# Patient Record
Sex: FEMALE | Race: WHITE | NOT HISPANIC OR LATINO | Employment: OTHER | URBAN - METROPOLITAN AREA
[De-identification: names, ages, dates, MRNs, and addresses within clinical notes are randomized per-mention and may not be internally consistent; named-entity substitution may affect disease eponyms.]

---

## 2017-01-10 ENCOUNTER — OFFICE VISIT (OUTPATIENT)
Dept: FAMILY MEDICINE CLINIC | Facility: CLINIC | Age: 72
End: 2017-01-10

## 2017-01-10 VITALS
HEART RATE: 92 BPM | SYSTOLIC BLOOD PRESSURE: 150 MMHG | DIASTOLIC BLOOD PRESSURE: 70 MMHG | TEMPERATURE: 99 F | HEIGHT: 63 IN | BODY MASS INDEX: 31.36 KG/M2 | OXYGEN SATURATION: 96 % | RESPIRATION RATE: 18 BRPM | WEIGHT: 177 LBS

## 2017-01-10 DIAGNOSIS — I10 ESSENTIAL HYPERTENSION: ICD-10-CM

## 2017-01-10 PROCEDURE — 99213 OFFICE O/P EST LOW 20 MIN: CPT | Performed by: FAMILY MEDICINE

## 2017-01-10 RX ORDER — HYDROCHLOROTHIAZIDE 25 MG/1
50 TABLET ORAL DAILY
Qty: 180 TABLET | Refills: 3 | Status: SHIPPED | OUTPATIENT
Start: 2017-01-10 | End: 2017-07-18 | Stop reason: SDUPTHER

## 2017-01-10 NOTE — MR AVS SNAPSHOT
Jory Crowley   1/10/2017 10:00 AM   Office Visit    Dept Phone:  [phone]   Encounter #:  [id number]    Provider:  Derek Arellano MD   Department:  [facility]                Your Full Care Plan              Today's Medication Changes          These changes are accurate as of: 1/10/17 10:22 AM.  If you have any questions, ask your nurse or doctor.               Stop taking medication(s)listed here:     losartan 100 MG tablet   Commonly known as:  COZAAR   Stopped by:  Derek Arellano MD                Where to Get Your Medications      These medications were sent to [facility] [address] [place], [place] [facility] [address] [facility] [address] - [phone]  - [phone]   [address] [facility] [address]     Phone:  [phone]     hydrochlorothiazide 25 MG tablet                  Your Updated Medication List          This list is accurate as of: 1/10/17 10:22 AM.  Always use your most recent med list.                alendronate 70 MG tablet   Commonly known as:  FOSAMAX   Take 1 tablet by mouth every 7 days.       clobetasol 0.05 % cream   Commonly known as:  TEMOVATE   Apply  topically 2 (two) times a day.       hydrochlorothiazide 25 MG tablet   Commonly known as:  HYDRODIURIL   Take 2 tablets by mouth Daily.       SYNTHROID 150 MCG tablet   Generic drug:  levothyroxine   Take 1 tablet by mouth daily.               You Were Diagnosed With        Codes Comments    Essential hypertension     ICD-10-CM: I10  ICD-9-CM: 401.9       Instructions     None    Patient Instructions History      Upcoming Appointments     Visit Type Date Time Department    [facility]2017 [facility]    OFFICE VISIT 7/18/2017 10:00 AM [facility]      MyChart Signup     Our records indicate that you have declined [facility] signup. If you would like to sign up for [facility], please email  [email or web address] or call [phone] to obtain an activation code.             Other Info from Your Visit           Your Appointments     Jul 18, 2017 10:00 AM EDT   Office Visit with Derek Arellano MD   [facility]ICINE (--)    [address] [facility] [address] [phone]           Arrive 15 minutes prior to appointment.              Allergies     Codeine      Nabumetone      Propoxyphene        Reason for Visit     Follow-up Pt here for f/u hypertension      Vital Signs     Blood Pressure Pulse Temperature Respirations Height Weight    150/70 92 99 °F (37.2 °C) 18 63\" (160 cm) 177 lb (80.3 kg)    Oxygen Saturation Body Mass Index Smoking Status             96% 31.35 kg/m2 Current Every Day Smoker         Problems and Diagnoses Noted     High blood pressure        "

## 2017-01-10 NOTE — PROGRESS NOTES
Subjective   Jory Crowley is a 71 y.o. female.     Chief Complaint   Patient presents with   • Follow-up     Pt here for f/u hypertension       HPI     HTN: Stable. Home BPs are improving. Taking HCTZ 1-2 tabs a day. Discontinued the Losartan after previous visit. Diet and exercise are improving. She stays well hydrated. She is getting a dog soon and will increase her physical activity walking the dog. She is trying to avoid salt.     The following portions of the patient's history were reviewed and updated as appropriate: allergies, current medications, past family history, past medical history, past social history, past surgical history and problem list.    Review of Systems   All other systems reviewed and are negative.      Objective  Vitals:    01/10/17 1002   BP: 150/70   Pulse: 92   Resp: 18   Temp: 99 °F (37.2 °C)   SpO2: 96%        Physical Exam   Constitutional: She is oriented to person, place, and time. She appears well-developed and well-nourished. No distress.   HENT:   Head: Normocephalic and atraumatic.   Right Ear: External ear normal.   Left Ear: External ear normal.   Nose: Nose normal.   Mouth/Throat: Oropharynx is clear and moist.   Eyes: Conjunctivae and EOM are normal. Pupils are equal, round, and reactive to light. Right eye exhibits no discharge. Left eye exhibits no discharge. No scleral icterus.   Cardiovascular: Normal rate, regular rhythm and normal heart sounds.    Pulmonary/Chest: Effort normal and breath sounds normal. No respiratory distress. She has no wheezes. She has no rales.   Abdominal: Soft. Bowel sounds are normal. She exhibits no distension. There is no tenderness.   Neurological: She is alert and oriented to person, place, and time.   Skin: Skin is warm and dry. She is not diaphoretic.   Nursing note and vitals reviewed.        Current Outpatient Prescriptions:   •  alendronate (FOSAMAX) 70 MG tablet, Take 1 tablet by mouth every 7 days., Disp: 12 tablet, Rfl: 3  •   clobetasol (TEMOVATE) 0.05 % cream, Apply  topically 2 (two) times a day., Disp: 30 g, Rfl: 3  •  hydrochlorothiazide (HYDRODIURIL) 25 MG tablet, Take 2 tablets by mouth Daily., Disp: 180 tablet, Rfl: 3  •  SYNTHROID 150 MCG tablet, Take 1 tablet by mouth daily., Disp: 90 tablet, Rfl: 3    Procedures    Lab Results (most recent)     None          Assessment/Plan   Jory was seen today for follow-up.    Diagnoses and all orders for this visit:    Essential hypertension  -     hydrochlorothiazide (HYDRODIURIL) 25 MG tablet; Take 2 tablets by mouth Daily.    Hypertension is stable.  Continue current therapy with 50 mg HCTZ daily.  Advised the patient to take this medication twice a day instead of one to 2 times a day.  This will account for better control of blood pressure.  Also advised to continue her diet and exercise regimen.  We'll follow-up in 6 months.    Return in about 6 months (around 7/10/2017) for Recheck.      Derek Arellano MD

## 2017-03-09 ENCOUNTER — TELEPHONE (OUTPATIENT)
Dept: FAMILY MEDICINE CLINIC | Facility: CLINIC | Age: 72
End: 2017-03-09

## 2017-03-09 RX ORDER — CLOBETASOL PROPIONATE 0.5 MG/G
CREAM TOPICAL 2 TIMES DAILY
Qty: 30 G | Refills: 3 | Status: SHIPPED | OUTPATIENT
Start: 2017-03-09 | End: 2017-07-18 | Stop reason: SDUPTHER

## 2017-07-11 RX ORDER — ALENDRONATE SODIUM 70 MG/1
70 TABLET ORAL
Qty: 12 TABLET | Refills: 3 | Status: SHIPPED | OUTPATIENT
Start: 2017-07-11 | End: 2018-01-18 | Stop reason: SDUPTHER

## 2017-07-18 ENCOUNTER — OFFICE VISIT (OUTPATIENT)
Dept: FAMILY MEDICINE CLINIC | Facility: CLINIC | Age: 72
End: 2017-07-18

## 2017-07-18 VITALS
SYSTOLIC BLOOD PRESSURE: 128 MMHG | OXYGEN SATURATION: 94 % | HEART RATE: 84 BPM | RESPIRATION RATE: 14 BRPM | WEIGHT: 166.4 LBS | HEIGHT: 63 IN | DIASTOLIC BLOOD PRESSURE: 70 MMHG | TEMPERATURE: 98.2 F | BODY MASS INDEX: 29.48 KG/M2

## 2017-07-18 DIAGNOSIS — E78.5 HYPERLIPIDEMIA, UNSPECIFIED HYPERLIPIDEMIA TYPE: Primary | ICD-10-CM

## 2017-07-18 DIAGNOSIS — L90.0 LICHEN SCLEROSUS: ICD-10-CM

## 2017-07-18 DIAGNOSIS — I10 ESSENTIAL HYPERTENSION: ICD-10-CM

## 2017-07-18 DIAGNOSIS — R73.01 IMPAIRED FASTING GLUCOSE: ICD-10-CM

## 2017-07-18 DIAGNOSIS — E03.9 ACQUIRED HYPOTHYROIDISM: ICD-10-CM

## 2017-07-18 PROCEDURE — 99214 OFFICE O/P EST MOD 30 MIN: CPT | Performed by: FAMILY MEDICINE

## 2017-07-18 RX ORDER — LEVOTHYROXINE SODIUM 150 MCG
150 TABLET ORAL DAILY
Qty: 90 TABLET | Refills: 3 | Status: SHIPPED | OUTPATIENT
Start: 2017-07-18 | End: 2018-01-18 | Stop reason: SDUPTHER

## 2017-07-18 RX ORDER — HYDROCHLOROTHIAZIDE 25 MG/1
50 TABLET ORAL DAILY
Qty: 180 TABLET | Refills: 3 | Status: SHIPPED | OUTPATIENT
Start: 2017-07-18 | End: 2018-01-18 | Stop reason: SDUPTHER

## 2017-07-18 RX ORDER — CLOBETASOL PROPIONATE 0.5 MG/G
CREAM TOPICAL 2 TIMES DAILY
Qty: 30 G | Refills: 3 | Status: SHIPPED | OUTPATIENT
Start: 2017-07-18 | End: 2018-01-18 | Stop reason: SDUPTHER

## 2017-07-18 NOTE — PROGRESS NOTES
Subjective   Jory Crowley is a 71 y.o. female.     Chief Complaint   Patient presents with   • Follow-up     Patient has 6 month follow up. She needs to have lab work done.    • Med Refill     Patient needs a refill on Fosamax and Temovate.        HPI     Hypertension: Stable and well controlled at this time on hydrochlorothiazide 25 mg twice a day.  Blood pressure today 128/70.  Patient is been losing weight through diet and exercise.    Hyperlipidemia: Stable and well controlled with diet and exercise.  Repeat labs needed today.  Diet and exercise controlling at this time.    History of impaired glucose test.  We'll get repeat labs today due to patient having lost a lot of weight.    Hypothyroidism: Stable and well controlled on Synthroid 150 µg daily.  No signs or symptoms of hyper or hypothyroidism.    History of lichen sclerosus of the vaginal area which is well-controlled with clobetasol cream.  In need of refills.    The following portions of the patient's history were reviewed and updated as appropriate: allergies, current medications, past family history, past medical history, past social history, past surgical history and problem list.    Review of Systems   Constitutional: Negative for activity change.   All other systems reviewed and are negative.      Objective  Vitals:    07/18/17 0958   BP: 128/70   Pulse: 84   Resp: 14   Temp: 98.2 °F (36.8 °C)   SpO2: 94%        Physical Exam   Constitutional: She is oriented to person, place, and time. She appears well-developed and well-nourished. No distress.   HENT:   Head: Normocephalic and atraumatic.   Right Ear: External ear normal.   Left Ear: External ear normal.   Nose: Nose normal.   Mouth/Throat: Oropharynx is clear and moist.   Eyes: Conjunctivae and EOM are normal. Pupils are equal, round, and reactive to light. Right eye exhibits no discharge. Left eye exhibits no discharge. No scleral icterus.   Cardiovascular: Normal rate, regular rhythm and  normal heart sounds.    Pulmonary/Chest: Effort normal and breath sounds normal. No respiratory distress. She has no wheezes. She has no rales.   Abdominal: Soft. Bowel sounds are normal. She exhibits no distension. There is no tenderness.   Neurological: She is alert and oriented to person, place, and time.   Skin: Skin is warm and dry. She is not diaphoretic.   Nursing note and vitals reviewed.        Current Outpatient Prescriptions:   •  alendronate (FOSAMAX) 70 MG tablet, Take 1 tablet by mouth Every 7 (Seven) Days., Disp: 12 tablet, Rfl: 3  •  clobetasol (TEMOVATE) 0.05 % cream, Apply  topically 2 (Two) Times a Day., Disp: 30 g, Rfl: 3  •  hydrochlorothiazide (HYDRODIURIL) 25 MG tablet, Take 2 tablets by mouth Daily., Disp: 180 tablet, Rfl: 3  •  SYNTHROID 150 MCG tablet, Take 1 tablet by mouth Daily., Disp: 90 tablet, Rfl: 3    Procedures    Lab Results (most recent)     None          Assessment/Plan   Jory was seen today for follow-up and med refill.    Diagnoses and all orders for this visit:    Hyperlipidemia, unspecified hyperlipidemia type  -     Lipid Panel    Essential hypertension  -     hydrochlorothiazide (HYDRODIURIL) 25 MG tablet; Take 2 tablets by mouth Daily.  -     CBC Auto Differential  -     Comprehensive Metabolic Panel    Impaired fasting glucose  -     Comprehensive Metabolic Panel  -     Hemoglobin A1c    Acquired hypothyroidism  -     SYNTHROID 150 MCG tablet; Take 1 tablet by mouth Daily.  -     CBC Auto Differential  -     Thyroid Panel With TSH    Lichen sclerosus  -     clobetasol (TEMOVATE) 0.05 % cream; Apply  topically 2 (Two) Times a Day.  -     CBC Auto Differential    Labs and refills as above.  We'll adjust treatment plan based on lab results.  Follow-up in 6 months for Medicare wellness visit.    Return in about 6 months (around 1/18/2018) for Medicare wellness exam.      Derek Arellano MD

## 2017-07-19 LAB
ALBUMIN SERPL-MCNC: 4.6 G/DL (ref 3.5–5.2)
ALBUMIN/GLOB SERPL: 1.9 G/DL
ALP SERPL-CCNC: 110 U/L (ref 39–117)
ALT SERPL-CCNC: 25 U/L (ref 1–33)
AST SERPL-CCNC: 19 U/L (ref 1–32)
BASOPHILS # BLD AUTO: 0.04 10*3/MM3 (ref 0–0.2)
BASOPHILS NFR BLD AUTO: 0.5 % (ref 0–1.5)
BILIRUB SERPL-MCNC: 0.4 MG/DL (ref 0.1–1.2)
BUN SERPL-MCNC: 14 MG/DL (ref 8–23)
BUN/CREAT SERPL: 18.4 (ref 7–25)
CALCIUM SERPL-MCNC: 9.7 MG/DL (ref 8.6–10.5)
CHLORIDE SERPL-SCNC: 97 MMOL/L (ref 98–107)
CHOLEST SERPL-MCNC: 245 MG/DL (ref 0–200)
CO2 SERPL-SCNC: 24.4 MMOL/L (ref 22–29)
CREAT SERPL-MCNC: 0.76 MG/DL (ref 0.57–1)
EOSINOPHIL # BLD AUTO: 0.13 10*3/MM3 (ref 0–0.7)
EOSINOPHIL NFR BLD AUTO: 1.6 % (ref 0.3–6.2)
ERYTHROCYTE [DISTWIDTH] IN BLOOD BY AUTOMATED COUNT: 13.6 % (ref 11.7–13)
FT4I SERPL CALC-MCNC: 3.1 (ref 1.2–4.9)
GLOBULIN SER CALC-MCNC: 2.4 GM/DL
GLUCOSE SERPL-MCNC: 94 MG/DL (ref 65–99)
HBA1C MFR BLD: 5.8 % (ref 4.8–5.6)
HCT VFR BLD AUTO: 47.2 % (ref 35.6–45.5)
HDLC SERPL-MCNC: 62 MG/DL (ref 40–60)
HGB BLD-MCNC: 15.5 G/DL (ref 11.9–15.5)
IMM GRANULOCYTES # BLD: 0 10*3/MM3 (ref 0–0.03)
IMM GRANULOCYTES NFR BLD: 0 % (ref 0–0.5)
LDLC SERPL CALC-MCNC: 157 MG/DL (ref 0–100)
LYMPHOCYTES # BLD AUTO: 2.04 10*3/MM3 (ref 0.9–4.8)
LYMPHOCYTES NFR BLD AUTO: 25.8 % (ref 19.6–45.3)
MCH RBC QN AUTO: 33 PG (ref 26.9–32)
MCHC RBC AUTO-ENTMCNC: 32.8 G/DL (ref 32.4–36.3)
MCV RBC AUTO: 100.4 FL (ref 80.5–98.2)
MONOCYTES # BLD AUTO: 0.44 10*3/MM3 (ref 0.2–1.2)
MONOCYTES NFR BLD AUTO: 5.6 % (ref 5–12)
NEUTROPHILS # BLD AUTO: 5.26 10*3/MM3 (ref 1.9–8.1)
NEUTROPHILS NFR BLD AUTO: 66.5 % (ref 42.7–76)
PLATELET # BLD AUTO: 326 10*3/MM3 (ref 140–500)
POTASSIUM SERPL-SCNC: 4.3 MMOL/L (ref 3.5–5.2)
PROT SERPL-MCNC: 7 G/DL (ref 6–8.5)
RBC # BLD AUTO: 4.7 10*6/MM3 (ref 3.9–5.2)
SODIUM SERPL-SCNC: 139 MMOL/L (ref 136–145)
T3RU NFR SERPL: 33 % (ref 24–39)
T4 SERPL-MCNC: 9.4 UG/DL (ref 4.5–12)
TRIGL SERPL-MCNC: 132 MG/DL (ref 0–150)
TSH SERPL DL<=0.005 MIU/L-ACNC: 4.12 UIU/ML (ref 0.45–4.5)
VLDLC SERPL CALC-MCNC: 26.4 MG/DL (ref 5–40)
WBC # BLD AUTO: 7.91 10*3/MM3 (ref 4.5–10.7)

## 2017-12-07 ENCOUNTER — OFFICE VISIT (OUTPATIENT)
Dept: FAMILY MEDICINE CLINIC | Facility: CLINIC | Age: 72
End: 2017-12-07

## 2017-12-07 VITALS
RESPIRATION RATE: 14 BRPM | SYSTOLIC BLOOD PRESSURE: 130 MMHG | HEART RATE: 78 BPM | BODY MASS INDEX: 35.41 KG/M2 | OXYGEN SATURATION: 98 % | HEIGHT: 55 IN | TEMPERATURE: 99.1 F | DIASTOLIC BLOOD PRESSURE: 56 MMHG | WEIGHT: 153 LBS

## 2017-12-07 DIAGNOSIS — R68.83 CHILLS: ICD-10-CM

## 2017-12-07 DIAGNOSIS — R10.30 LOWER ABDOMINAL PAIN: ICD-10-CM

## 2017-12-07 DIAGNOSIS — J10.1 INFLUENZA A: Primary | ICD-10-CM

## 2017-12-07 LAB
BILIRUB BLD-MCNC: ABNORMAL MG/DL
CLARITY, POC: ABNORMAL
COLOR UR: ABNORMAL
EXPIRATION DATE: ABNORMAL
FLUAV AG NPH QL: ABNORMAL
FLUBV AG NPH QL: ABNORMAL
GLUCOSE UR STRIP-MCNC: NEGATIVE MG/DL
INTERNAL CONTROL: ABNORMAL
KETONES UR QL: NEGATIVE
LEUKOCYTE EST, POC: NEGATIVE
Lab: ABNORMAL
NITRITE UR-MCNC: NEGATIVE MG/ML
PH UR: 5.5 [PH] (ref 5–8)
PROT UR STRIP-MCNC: ABNORMAL MG/DL
RBC # UR STRIP: ABNORMAL /UL
SP GR UR: 1.02 (ref 1–1.03)
UROBILINOGEN UR QL: NORMAL

## 2017-12-07 PROCEDURE — 99214 OFFICE O/P EST MOD 30 MIN: CPT | Performed by: FAMILY MEDICINE

## 2017-12-07 PROCEDURE — 81003 URINALYSIS AUTO W/O SCOPE: CPT | Performed by: FAMILY MEDICINE

## 2017-12-07 PROCEDURE — 87804 INFLUENZA ASSAY W/OPTIC: CPT | Performed by: FAMILY MEDICINE

## 2017-12-07 RX ORDER — OSELTAMIVIR PHOSPHATE 75 MG/1
75 CAPSULE ORAL 2 TIMES DAILY
Qty: 10 CAPSULE | Refills: 0 | Status: SHIPPED | OUTPATIENT
Start: 2017-12-07 | End: 2017-12-12

## 2017-12-07 NOTE — PROGRESS NOTES
Subjective   Jory Crowley is a 72 y.o. female.     Chief Complaint   Patient presents with   • Chills     Patient started feeling fatigued yesterday. She is having abdominal pain , disarrhea, cough, chills, and low backpain.        HPI     Patient presents to the office with 1 day history of fatigue, fever, body aches, low back pain.  She's also had sinus drainage and congestion with cough.  She denies any sick contacts.  She did get the flu shot.  Over-the-counter therapies have not improved her symptoms.    The following portions of the patient's history were reviewed and updated as appropriate: allergies, current medications, past family history, past medical history, past social history, past surgical history and problem list.    Review of Systems   Constitutional: Positive for chills.   Respiratory: Positive for cough.    Gastrointestinal: Positive for abdominal pain, diarrhea and nausea.   All other systems reviewed and are negative.      Objective  Vitals:    12/07/17 1246   BP: 130/56   Pulse: 78   Resp: 14   Temp: 99.1 °F (37.3 °C)   SpO2: 98%        Physical Exam   Constitutional: She is oriented to person, place, and time. She appears well-developed and well-nourished. No distress.   HENT:   Head: Normocephalic and atraumatic.   Right Ear: Tympanic membrane, external ear and ear canal normal.   Left Ear: Tympanic membrane, external ear and ear canal normal.   Nose: Mucosal edema and rhinorrhea present. Right sinus exhibits no maxillary sinus tenderness and no frontal sinus tenderness. Left sinus exhibits no maxillary sinus tenderness and no frontal sinus tenderness.   Mouth/Throat: Uvula is midline. Posterior oropharyngeal erythema present. No oropharyngeal exudate, posterior oropharyngeal edema or tonsillar abscesses. No tonsillar exudate.   Eyes: Conjunctivae, EOM and lids are normal. Pupils are equal, round, and reactive to light.   Cardiovascular: Normal rate, regular rhythm and normal heart sounds.   Exam reveals no friction rub.    No murmur heard.  Pulmonary/Chest: Effort normal and breath sounds normal. No respiratory distress. She has no wheezes. She has no rales.   Abdominal: Soft. Bowel sounds are normal. She exhibits no distension. There is no tenderness. There is no rebound and no guarding.   Neurological: She is alert and oriented to person, place, and time.   Skin: Skin is warm and dry. She is not diaphoretic.   Nursing note and vitals reviewed.        Current Outpatient Prescriptions:   •  alendronate (FOSAMAX) 70 MG tablet, Take 1 tablet by mouth Every 7 (Seven) Days., Disp: 12 tablet, Rfl: 3  •  clobetasol (TEMOVATE) 0.05 % cream, Apply  topically 2 (Two) Times a Day., Disp: 30 g, Rfl: 3  •  hydrochlorothiazide (HYDRODIURIL) 25 MG tablet, Take 2 tablets by mouth Daily., Disp: 180 tablet, Rfl: 3  •  SYNTHROID 150 MCG tablet, Take 1 tablet by mouth Daily., Disp: 90 tablet, Rfl: 3  •  oseltamivir (TAMIFLU) 75 MG capsule, Take 1 capsule by mouth 2 (Two) Times a Day for 5 days. 1 tab PO BID x 5 days for influenza, Disp: 10 capsule, Rfl: 0    Procedures    Lab Results (most recent)     None          Assessment/Plan   Jory was seen today for chills.    Diagnoses and all orders for this visit:    Influenza A  -     oseltamivir (TAMIFLU) 75 MG capsule; Take 1 capsule by mouth 2 (Two) Times a Day for 5 days. 1 tab PO BID x 5 days for influenza    Lower abdominal pain  -     POC Urinalysis Dipstick, Automated  -     POC Influenza A / B  -     oseltamivir (TAMIFLU) 75 MG capsule; Take 1 capsule by mouth 2 (Two) Times a Day for 5 days. 1 tab PO BID x 5 days for influenza    Chills  -     POC Urinalysis Dipstick, Automated  -     POC Influenza A / B  -     oseltamivir (TAMIFLU) 75 MG capsule; Take 1 capsule by mouth 2 (Two) Times a Day for 5 days. 1 tab PO BID x 5 days for influenza    Influenza A positive.  Urinalysis negative.  We'll treat with Tamiflu as above.  Advised symptomatic management.    Return for  As needed.      Derek Arellano MD

## 2018-01-18 ENCOUNTER — OFFICE VISIT (OUTPATIENT)
Dept: FAMILY MEDICINE CLINIC | Facility: CLINIC | Age: 73
End: 2018-01-18

## 2018-01-18 VITALS
BODY MASS INDEX: 27.46 KG/M2 | HEART RATE: 70 BPM | WEIGHT: 155 LBS | RESPIRATION RATE: 14 BRPM | SYSTOLIC BLOOD PRESSURE: 130 MMHG | TEMPERATURE: 98.3 F | HEIGHT: 63 IN | DIASTOLIC BLOOD PRESSURE: 78 MMHG | OXYGEN SATURATION: 99 %

## 2018-01-18 DIAGNOSIS — M81.0 OSTEOPOROSIS, UNSPECIFIED OSTEOPOROSIS TYPE, UNSPECIFIED PATHOLOGICAL FRACTURE PRESENCE: ICD-10-CM

## 2018-01-18 DIAGNOSIS — L90.0 LICHEN SCLEROSUS: ICD-10-CM

## 2018-01-18 DIAGNOSIS — Z23 NEED FOR SHINGLES VACCINE: ICD-10-CM

## 2018-01-18 DIAGNOSIS — E03.9 ACQUIRED HYPOTHYROIDISM: ICD-10-CM

## 2018-01-18 DIAGNOSIS — I10 ESSENTIAL HYPERTENSION: ICD-10-CM

## 2018-01-18 DIAGNOSIS — Z00.00 ENCOUNTER FOR WELLNESS EXAMINATION: Primary | ICD-10-CM

## 2018-01-18 PROCEDURE — 96160 PT-FOCUSED HLTH RISK ASSMT: CPT | Performed by: FAMILY MEDICINE

## 2018-01-18 PROCEDURE — G0438 PPPS, INITIAL VISIT: HCPCS | Performed by: FAMILY MEDICINE

## 2018-01-18 PROCEDURE — 99214 OFFICE O/P EST MOD 30 MIN: CPT | Performed by: FAMILY MEDICINE

## 2018-01-18 RX ORDER — CLOBETASOL PROPIONATE 0.5 MG/G
CREAM TOPICAL EVERY 12 HOURS SCHEDULED
Qty: 30 G | Refills: 3 | Status: SHIPPED | OUTPATIENT
Start: 2018-01-18 | End: 2018-07-18 | Stop reason: SDUPTHER

## 2018-01-18 RX ORDER — ALENDRONATE SODIUM 70 MG/1
70 TABLET ORAL
Qty: 12 TABLET | Refills: 3 | Status: SHIPPED | OUTPATIENT
Start: 2018-01-18 | End: 2018-07-18 | Stop reason: SDUPTHER

## 2018-01-18 RX ORDER — LEVOTHYROXINE SODIUM 150 MCG
150 TABLET ORAL DAILY
Qty: 90 TABLET | Refills: 3 | Status: SHIPPED | OUTPATIENT
Start: 2018-01-18 | End: 2018-07-18 | Stop reason: SDUPTHER

## 2018-01-18 RX ORDER — HYDROCHLOROTHIAZIDE 25 MG/1
50 TABLET ORAL DAILY
Qty: 180 TABLET | Refills: 3 | Status: SHIPPED | OUTPATIENT
Start: 2018-01-18 | End: 2018-07-18 | Stop reason: SDUPTHER

## 2018-01-18 NOTE — PROGRESS NOTES
QUICK REFERENCE INFORMATION:  The ABCs of the Annual Wellness Visit    Initial Medicare Wellness Visit    HEALTH RISK ASSESSMENT    1945    Recent Hospitalizations:  No hospitalization(s) within the last year..        Current Medical Providers:  Patient Care Team:  Derek Arellano MD as PCP - General (Family Medicine)        Smoking Status:  History   Smoking Status   • Current Every Day Smoker   • Packs/day: 0.25   • Types: Cigarettes   Smokeless Tobacco   • Not on file       Alcohol Consumption:  History   Alcohol Use   • Yes     Comment: occ       Depression Screen:   PHQ-2/PHQ-9 Depression Screening 1/18/2018   Little interest or pleasure in doing things 0   Feeling down, depressed, or hopeless 0   Trouble falling or staying asleep, or sleeping too much 0   Feeling tired or having little energy 0   Poor appetite or overeating 0   Feeling bad about yourself - or that you are a failure or have let yourself or your family down 0   Trouble concentrating on things, such as reading the newspaper or watching television 0   Moving or speaking so slowly that other people could have noticed. Or the opposite - being so fidgety or restless that you have been moving around a lot more than usual 0   Thoughts that you would be better off dead, or of hurting yourself in some way 0   Total Score 0   If you checked off any problems, how difficult have these problems made it for you to do your work, take care of things at home, or get along with other people? Not difficult at all       Health Habits and Functional and Cognitive Screening:  Functional & Cognitive Status 1/18/2018   Do you have difficulty preparing food and eating? No   Do you have difficulty bathing yourself, getting dressed or grooming yourself? No   Do you have difficulty using the toilet? No   Do you have difficulty moving around from place to place? No   Do you have trouble with steps or getting out of a bed or a chair? No   In the past year have you  fallen or experienced a near fall? No   Current Diet Well Balanced Diet   Dental Exam Up to date   Eye Exam Up to date   Exercise (times per week) 7 times per week   Do you need help using the phone?  No   Are you deaf or do you have serious difficulty hearing?  No   Do you need help with transportation? No   Do you need help shopping? No   Do you need help preparing meals?  No   Do you need help with housework?  No   Do you need help with laundry? No   Do you need help taking your medications? No   Do you need help managing money? No   Have you felt unusual stress, anger or loneliness in the last month? No   Who do you live with? Spouse   If you need help, do you have trouble finding someone available to you? No   Have you been bothered in the last four weeks by sexual problems? No   Do you have difficulty concentrating, remembering or making decisions? No           Does the patient have evidence of cognitive impairment? No    Asiprin use counseling: Does not need ASA (and currently is not on it)      Recent Lab Results:    Visual Acuity:  No exam data present    Age-appropriate Screening Schedule:  Refer to the list below for future screening recommendations based on patient's age, sex and/or medical conditions. Orders for these recommended tests are listed in the plan section. The patient has been provided with a written plan.    Health Maintenance   Topic Date Due   • MAMMOGRAM  07/11/2016   • ZOSTER VACCINE  07/11/2016   • DXA SCAN  07/08/2017   • LIPID PANEL  07/18/2018   • TDAP/TD VACCINES (2 - Td) 03/27/2023   • COLONOSCOPY  01/12/2026   • INFLUENZA VACCINE  Completed   • PNEUMOCOCCAL VACCINES (65+ LOW/MEDIUM RISK)  Completed        Subjective   History of Present Illness    Jory Crowley is a 72 y.o. female who presents for an Annual Wellness Visit.    Patient has a stable past medical history of hypertension, hypothyroidism, and osteoporosis.  She is currently taking and tolerating alendronate,  hydrochlorothiazide, and Synthroid 150 µg daily.  She is in need of refills of these medications.  No adverse side effects noted.      The following portions of the patient's history were reviewed and updated as appropriate: allergies, current medications, past family history, past medical history, past social history, past surgical history and problem list.    Outpatient Medications Prior to Visit   Medication Sig Dispense Refill   • alendronate (FOSAMAX) 70 MG tablet Take 1 tablet by mouth Every 7 (Seven) Days. 12 tablet 3   • clobetasol (TEMOVATE) 0.05 % cream Apply  topically 2 (Two) Times a Day. 30 g 3   • hydrochlorothiazide (HYDRODIURIL) 25 MG tablet Take 2 tablets by mouth Daily. 180 tablet 3   • SYNTHROID 150 MCG tablet Take 1 tablet by mouth Daily. 90 tablet 3     No facility-administered medications prior to visit.        Patient Active Problem List   Diagnosis   • Atopic rhinitis   • Contusion   • Hyperlipidemia   • HTN (hypertension)   • Hypothyroidism   • Impaired fasting glucose   • Osteoporosis   • Skin disorder   • Vitamin deficiency       Advance Care Planning:  has NO advance directive - information provided to the patient today    Identification of Risk Factors:  Risk factors include: cardiovascular risk and increased fall risk.    Review of Systems   Constitutional: Negative for activity change.   All other systems reviewed and are negative.      Compared to one year ago, the patient feels her physical health is better.  Compared to one year ago, the patient feels her mental health is better.    Objective     Physical Exam   Constitutional: She is oriented to person, place, and time. She appears well-developed and well-nourished. No distress.   HENT:   Head: Normocephalic and atraumatic.   Right Ear: External ear normal.   Left Ear: External ear normal.   Nose: Nose normal.   Mouth/Throat: Oropharynx is clear and moist.   Eyes: Conjunctivae and EOM are normal. Pupils are equal, round, and  "reactive to light. Right eye exhibits no discharge. Left eye exhibits no discharge. No scleral icterus.   Cardiovascular: Normal rate, regular rhythm and normal heart sounds.    Pulmonary/Chest: Effort normal and breath sounds normal. No respiratory distress. She has no wheezes. She has no rales.   Abdominal: Soft. Bowel sounds are normal. She exhibits no distension. There is no tenderness.   Neurological: She is alert and oriented to person, place, and time.   Skin: Skin is warm and dry. She is not diaphoretic.   Nursing note and vitals reviewed.      Vitals:    01/18/18 1029   BP: 130/78   BP Location: Right arm   Patient Position: Sitting   Cuff Size: Adult   Pulse: 70   Resp: 14   Temp: 98.3 °F (36.8 °C)   TempSrc: Oral   SpO2: 99%   Weight: 70.3 kg (155 lb)   Height: 160 cm (63\")   PainSc: 0-No pain       Body mass index is 27.46 kg/(m^2).  Discussed the patient's BMI with her. BMI is within normal parameters. No follow-up required.    Assessment/Plan   Patient Self-Management and Personalized Health Advice  The patient has been provided with information about: diet, exercise, weight management, prevention of cardiac or vascular disease, fall prevention and designing advance directives and preventive services including:   · Advance directive, Zostavax vaccine (Herpes Zoster).    Visit Diagnoses:    ICD-10-CM ICD-9-CM   1. Encounter for wellness examination Z00.00 V70.0   2. Lichen sclerosus L90.0 701.0   3. Essential hypertension I10 401.9   4. Acquired hypothyroidism E03.9 244.9   5. Need for shingles vaccine Z23 V04.89   6. Osteoporosis, unspecified osteoporosis type, unspecified pathological fracture presence M81.0 733.00       Orders Placed This Encounter   Procedures   • Varicella-Zoster Vaccine Subcutaneous       Outpatient Encounter Prescriptions as of 1/18/2018   Medication Sig Dispense Refill   • alendronate (FOSAMAX) 70 MG tablet Take 1 tablet by mouth Every 7 (Seven) Days. 12 tablet 3   • clobetasol " (TEMOVATE) 0.05 % cream Apply  topically Every 12 (Twelve) Hours. 30 g 3   • hydrochlorothiazide (HYDRODIURIL) 25 MG tablet Take 2 tablets by mouth Daily. 180 tablet 3   • SYNTHROID 150 MCG tablet Take 1 tablet by mouth Daily. 90 tablet 3   • [DISCONTINUED] alendronate (FOSAMAX) 70 MG tablet Take 1 tablet by mouth Every 7 (Seven) Days. 12 tablet 3   • [DISCONTINUED] clobetasol (TEMOVATE) 0.05 % cream Apply  topically 2 (Two) Times a Day. 30 g 3   • [DISCONTINUED] hydrochlorothiazide (HYDRODIURIL) 25 MG tablet Take 2 tablets by mouth Daily. 180 tablet 3   • [DISCONTINUED] SYNTHROID 150 MCG tablet Take 1 tablet by mouth Daily. 90 tablet 3     No facility-administered encounter medications on file as of 1/18/2018.        Reviewed use of high risk medication in the elderly: yes  Reviewed for potential of harmful drug interactions in the elderly: yes      Refills given for the patient's chronic stable medical problems as above.      Follow Up:  Return in about 6 months (around 7/18/2018) for Recheck.     An After Visit Summary and PPPS with all of these plans were given to the patient.

## 2018-01-18 NOTE — PATIENT INSTRUCTIONS
Advance Directive  Advance directives are the legal documents that allow you to make choices about your health care and medical treatment if you cannot speak for yourself. Advance directives are a way for you to communicate your wishes to family, friends, and health care providers. The specified people can then convey your decisions about end-of-life care to avoid confusion if you should become unable to communicate.  Ideally, the process of discussing and writing advance directives should happen over time rather than making decisions all at once. Advance directives can be modified as your situation changes, and you can change your mind at any time, even after you have signed the advance directives. Each state has its own laws regarding advance directives.  You may want to check with your health care provider, , or state representative about the law in your state. Below are some examples of advance directives.  HEALTH CARE PROXY AND DURABLE POWER OF  FOR HEALTH CARE  A health care proxy is a person (agent) appointed to make medical decisions for you if you cannot. Generally, people choose someone they know well and trust to represent their preferences when they can no longer do so. You should be sure to ask this person for agreement to act as your agent. An agent may have to exercise judgment in the event of a medical decision for which your wishes are not known.  A durable power of  for health care is a legal document that names your health care proxy. Depending on the laws in your state, after the document is written, it may also need to be:  · Signed.  · Notarized.  · Dated.  · Copied.  · Witnessed.  · Incorporated into your medical record.  You may also want to appoint someone to manage your financial affairs if you cannot. This is called a durable power of  for finances. It is a separate legal document from the durable power of  for health care. You may choose the same  person or someone different from your health care proxy to act as your agent in financial matters.  LIVING WILL  A living will is a set of instructions documenting your wishes about medical care when you cannot care for yourself. It is used if you become:  · Terminally ill.  · Incapacitated.  · Unable to communicate.  · Unable to make decisions.  Items to consider in your living will include:  · The use or non-use of life-sustaining equipment, such as dialysis machines and breathing machines (ventilators).  · A do not resuscitate (DNR) order, which is the instruction not to use cardiopulmonary resuscitation (CPR) if breathing or heartbeat stops.  · Tube feeding.  · Withholding of food and fluids.  · Comfort (palliative) care when the goal becomes comfort rather than a cure.  · Organ and tissue donation.  A living will does not give instructions about distribution of your money and property if you should pass away. It is advisable to seek the expert advice of a  in drawing up a will regarding your possessions. Decisions about taxes, beneficiaries, and asset distribution will be legally binding. This process can relieve your family and friends of any burdens surrounding disputes or questions that may come up about the allocation of your assets.  DO NOT RESUSCITATE (DNR)  A do not resuscitate (DNR) order is a request to not have CPR in the event that your heart stops beating or you stop breathing. Unless given other instructions, a health care provider will try to help any patient whose heart has stopped or who has stopped breathing.   This information is not intended to replace advice given to you by your health care provider. Make sure you discuss any questions you have with your health care provider.  Document Released: 03/26/2009 Document Revised: 04/10/2017 Document Reviewed: 05/07/2014  [employer] Patient Education © 2017 [employer]    Medicare Wellness  Personal Prevention Plan of Service      Date of Office Visit:  2018  Encounter Provider:  Derek Arellano MD  Place of Service:  [facility]  Patient Name: Jory Crowley  :  1945    As part of the Medicare Wellness portion of your visit today, we are providing you with this personalized preventive plan of services (PPPS). This plan is based upon recommendations of the [place] Preventive Services Task Force (USPSTF) and the Advisory Committee on Immunization Practices (ACIP).    This lists the preventive care services that should be considered, and provides dates of when you are due. Items listed as completed are up-to-date and do not require any further intervention.    Health Maintenance   Topic Date Due   • HEPATITIS C SCREENING  2016   • MAMMOGRAM  2016   • ZOSTER VACCINE  2016   • DXA SCAN  2017   • LIPID PANEL  2018   • MEDICARE ANNUAL WELLNESS  2019   • TDAP/TD VACCINES (2 - Td) 2023   • COLONOSCOPY  2026   • INFLUENZA VACCINE  Completed   • PNEUMOCOCCAL VACCINES (65+ LOW/MEDIUM RISK)  Completed       No orders of the defined types were placed in this encounter.      Return in about 6 months (around 2018) for Recheck.

## 2018-06-22 ENCOUNTER — OFFICE VISIT (OUTPATIENT)
Dept: FAMILY MEDICINE CLINIC | Facility: CLINIC | Age: 73
End: 2018-06-22

## 2018-06-22 VITALS
OXYGEN SATURATION: 96 % | WEIGHT: 160.4 LBS | BODY MASS INDEX: 28.42 KG/M2 | TEMPERATURE: 98.3 F | HEART RATE: 95 BPM | DIASTOLIC BLOOD PRESSURE: 72 MMHG | HEIGHT: 63 IN | SYSTOLIC BLOOD PRESSURE: 126 MMHG

## 2018-06-22 DIAGNOSIS — K58.0 IRRITABLE BOWEL SYNDROME WITH DIARRHEA: Primary | ICD-10-CM

## 2018-06-22 PROCEDURE — 99213 OFFICE O/P EST LOW 20 MIN: CPT | Performed by: NURSE PRACTITIONER

## 2018-06-22 NOTE — PROGRESS NOTES
"Subjective   Jory Crowley is a 72 y.o. female.     History of Present Illness   Patient presenting to the office today with a 2 week onset of diarrhea.  She does have a history of IBS she's under a considerable amount of stress right now she believes she is having a flare of her IBS due to that.  She does not want any long-term medications to help her with her IBS because she's been managing fine without any.  She is taking Imodium daily and she is here seeking any other type of short-term medication that would help stop the diarrhea.  She has no nausea vomiting fever or body aches no change in appetite  The following portions of the patient's history were reviewed and updated as appropriate: allergies, current medications, past social history and problem list.    Review of Systems   Gastrointestinal: Positive for diarrhea.   All other systems reviewed and are negative.      Objective   /72 (BP Location: Left arm, Patient Position: Sitting)   Pulse 95   Temp 98.3 °F (36.8 °C)   Ht 160 cm (62.99\")   Wt 72.8 kg (160 lb 6.4 oz)   SpO2 96%   BMI 28.42 kg/m²   Physical Exam   Constitutional: She is oriented to person, place, and time. Vital signs are normal. She appears well-developed and well-nourished. No distress.   HENT:   Head: Normocephalic.   Cardiovascular: Normal rate, regular rhythm and normal heart sounds.    Pulmonary/Chest: Effort normal and breath sounds normal.   Neurological: She is alert and oriented to person, place, and time. Gait normal.   Psychiatric: She has a normal mood and affect. Her behavior is normal. Judgment and thought content normal.   Vitals reviewed.      Assessment/Plan   Problem List Items Addressed This Visit        Digestive    Irritable bowel syndrome with diarrhea - Primary        Florastor   rto when needed     "

## 2018-07-18 ENCOUNTER — OFFICE VISIT (OUTPATIENT)
Dept: FAMILY MEDICINE CLINIC | Facility: CLINIC | Age: 73
End: 2018-07-18

## 2018-07-18 VITALS
BODY MASS INDEX: 28.53 KG/M2 | WEIGHT: 161 LBS | HEIGHT: 63 IN | DIASTOLIC BLOOD PRESSURE: 74 MMHG | TEMPERATURE: 98.3 F | SYSTOLIC BLOOD PRESSURE: 136 MMHG | RESPIRATION RATE: 14 BRPM | OXYGEN SATURATION: 98 % | HEART RATE: 74 BPM

## 2018-07-18 DIAGNOSIS — E78.5 HYPERLIPIDEMIA, UNSPECIFIED HYPERLIPIDEMIA TYPE: ICD-10-CM

## 2018-07-18 DIAGNOSIS — I10 ESSENTIAL HYPERTENSION: ICD-10-CM

## 2018-07-18 DIAGNOSIS — R73.01 IMPAIRED FASTING GLUCOSE: ICD-10-CM

## 2018-07-18 DIAGNOSIS — E03.9 ACQUIRED HYPOTHYROIDISM: Primary | ICD-10-CM

## 2018-07-18 DIAGNOSIS — M81.0 OSTEOPOROSIS, UNSPECIFIED OSTEOPOROSIS TYPE, UNSPECIFIED PATHOLOGICAL FRACTURE PRESENCE: ICD-10-CM

## 2018-07-18 DIAGNOSIS — L90.0 LICHEN SCLEROSUS: ICD-10-CM

## 2018-07-18 PROCEDURE — 99214 OFFICE O/P EST MOD 30 MIN: CPT | Performed by: FAMILY MEDICINE

## 2018-07-18 RX ORDER — LEVOTHYROXINE SODIUM 150 MCG
150 TABLET ORAL DAILY
Qty: 90 TABLET | Refills: 3 | Status: SHIPPED | OUTPATIENT
Start: 2018-07-18 | End: 2019-01-18 | Stop reason: SDUPTHER

## 2018-07-18 RX ORDER — CLOBETASOL PROPIONATE 0.5 MG/G
CREAM TOPICAL EVERY 12 HOURS SCHEDULED
Qty: 30 G | Refills: 3 | Status: SHIPPED | OUTPATIENT
Start: 2018-07-18 | End: 2019-01-18 | Stop reason: SDUPTHER

## 2018-07-18 RX ORDER — HYDROCHLOROTHIAZIDE 25 MG/1
50 TABLET ORAL DAILY
Qty: 180 TABLET | Refills: 3 | Status: SHIPPED | OUTPATIENT
Start: 2018-07-18 | End: 2018-10-29

## 2018-07-18 RX ORDER — ALENDRONATE SODIUM 70 MG/1
70 TABLET ORAL
Qty: 12 TABLET | Refills: 3 | Status: SHIPPED | OUTPATIENT
Start: 2018-07-18 | End: 2019-06-16 | Stop reason: SDUPTHER

## 2018-07-18 NOTE — PROGRESS NOTES
Jory Crowley is a 72 y.o. female.     Chief Complaint   Patient presents with   • Thyroid Problem     Patient has 6 months follow up on lab work.        HPI     Patient presents the office today to follow-up on a number of issues.  Patient has a history of hypothyroidism for which she takes 150 µg of Synthroid.  Tolerating well.  Has a history of hyperlipidemia for which she is currently doing healthy diet and exercise.  Stays quite active.  History of hypertension for which she is taking and tolerating hydrochlorothiazide 25 mg daily.  Has a history of elevated blood sugars.  Last hemoglobin A1c was in the prediabetic range.  Patient also with osteoporosis for which she is taking alendronate 70 mg weekly.  History of lichen sclerosis for which she uses clobetasol cream.  She is in need of refills of her medications as well as blood work to evaluate the status of these medical problems.    The following portions of the patient's history were reviewed and updated as appropriate: allergies, current medications, past family history, past medical history, past social history, past surgical history and problem list.    Review of Systems   Constitutional: Negative for activity change.   All other systems reviewed and are negative.      Objective  Vitals:    07/18/18 0912   BP: 136/74   Pulse: 74   Resp: 14   Temp: 98.3 °F (36.8 °C)   SpO2: 98%       Physical Exam   Constitutional: She is oriented to person, place, and time. She appears well-developed and well-nourished. No distress.   HENT:   Head: Normocephalic and atraumatic.   Right Ear: External ear normal.   Left Ear: External ear normal.   Nose: Nose normal.   Mouth/Throat: Oropharynx is clear and moist.   Eyes: Pupils are equal, round, and reactive to light. Conjunctivae and EOM are normal. Right eye exhibits no discharge. Left eye exhibits no discharge. No scleral icterus.   Cardiovascular: Normal rate, regular rhythm and normal heart sounds.     Pulmonary/Chest: Effort normal and breath sounds normal. No respiratory distress. She has no wheezes. She has no rales.   Abdominal: Soft. Bowel sounds are normal. She exhibits no distension. There is no tenderness.   Neurological: She is alert and oriented to person, place, and time.   Skin: Skin is warm and dry. She is not diaphoretic.   Nursing note and vitals reviewed.        Current Outpatient Prescriptions:   •  alendronate (FOSAMAX) 70 MG tablet, Take 1 tablet by mouth Every 7 (Seven) Days., Disp: 12 tablet, Rfl: 3  •  clobetasol (TEMOVATE) 0.05 % cream, Apply  topically Every 12 (Twelve) Hours., Disp: 30 g, Rfl: 3  •  hydrochlorothiazide (HYDRODIURIL) 25 MG tablet, Take 2 tablets by mouth Daily., Disp: 180 tablet, Rfl: 3  •  SYNTHROID 150 MCG tablet, Take 1 tablet by mouth Daily., Disp: 90 tablet, Rfl: 3    Procedures    Lab Results (most recent)     None              Jory was seen today for thyroid problem.    Diagnoses and all orders for this visit:    Acquired hypothyroidism  -     CBC Auto Differential  -     Thyroid Panel With TSH  -     SYNTHROID 150 MCG tablet; Take 1 tablet by mouth Daily.    Hyperlipidemia, unspecified hyperlipidemia type  -     CBC Auto Differential  -     Lipid Panel    Essential hypertension  -     CBC Auto Differential  -     Comprehensive Metabolic Panel  -     hydrochlorothiazide (HYDRODIURIL) 25 MG tablet; Take 2 tablets by mouth Daily.    Impaired fasting glucose  -     CBC Auto Differential  -     Comprehensive Metabolic Panel  -     Hemoglobin A1c    Osteoporosis, unspecified osteoporosis type, unspecified pathological fracture presence  -     alendronate (FOSAMAX) 70 MG tablet; Take 1 tablet by mouth Every 7 (Seven) Days.    Lichen sclerosus  -     clobetasol (TEMOVATE) 0.05 % cream; Apply  topically Every 12 (Twelve) Hours.      We'll get labs as above to evaluate the status of these medical problems.  We'll adjust treatment plan accordingly.  Refills given.    Return  in about 3 months (around 10/18/2018) for Recheck.      Derek Arellano MD

## 2018-07-19 LAB
ALBUMIN SERPL-MCNC: 4.3 G/DL (ref 3.5–5.2)
ALBUMIN/GLOB SERPL: 2 G/DL
ALP SERPL-CCNC: 105 U/L (ref 39–117)
ALT SERPL-CCNC: 16 U/L (ref 1–33)
AST SERPL-CCNC: 13 U/L (ref 1–32)
BASOPHILS # BLD AUTO: 0.04 10*3/MM3 (ref 0–0.2)
BASOPHILS NFR BLD AUTO: 0.5 % (ref 0–1.5)
BILIRUB SERPL-MCNC: 0.2 MG/DL (ref 0.1–1.2)
BUN SERPL-MCNC: 13 MG/DL (ref 8–23)
BUN/CREAT SERPL: 19.1 (ref 7–25)
CALCIUM SERPL-MCNC: 9.8 MG/DL (ref 8.6–10.5)
CHLORIDE SERPL-SCNC: 102 MMOL/L (ref 98–107)
CHOLEST SERPL-MCNC: 217 MG/DL (ref 0–200)
CO2 SERPL-SCNC: 24.1 MMOL/L (ref 22–29)
CREAT SERPL-MCNC: 0.68 MG/DL (ref 0.57–1)
EOSINOPHIL # BLD AUTO: 0.13 10*3/MM3 (ref 0–0.7)
EOSINOPHIL NFR BLD AUTO: 1.7 % (ref 0.3–6.2)
ERYTHROCYTE [DISTWIDTH] IN BLOOD BY AUTOMATED COUNT: 13.2 % (ref 11.7–13)
FT4I SERPL CALC-MCNC: 2.8 (ref 1.2–4.9)
GLOBULIN SER CALC-MCNC: 2.2 GM/DL
GLUCOSE SERPL-MCNC: 78 MG/DL (ref 65–99)
HBA1C MFR BLD: 5.86 % (ref 4.8–5.6)
HCT VFR BLD AUTO: 46 % (ref 35.6–45.5)
HDLC SERPL-MCNC: 61 MG/DL (ref 40–60)
HGB BLD-MCNC: 14.6 G/DL (ref 11.9–15.5)
IMM GRANULOCYTES # BLD: 0 10*3/MM3 (ref 0–0.03)
IMM GRANULOCYTES NFR BLD: 0 % (ref 0–0.5)
LDLC SERPL CALC-MCNC: 134 MG/DL (ref 0–100)
LYMPHOCYTES # BLD AUTO: 1.88 10*3/MM3 (ref 0.9–4.8)
LYMPHOCYTES NFR BLD AUTO: 24.4 % (ref 19.6–45.3)
MCH RBC QN AUTO: 31.9 PG (ref 26.9–32)
MCHC RBC AUTO-ENTMCNC: 31.7 G/DL (ref 32.4–36.3)
MCV RBC AUTO: 100.4 FL (ref 80.5–98.2)
MONOCYTES # BLD AUTO: 0.57 10*3/MM3 (ref 0.2–1.2)
MONOCYTES NFR BLD AUTO: 7.4 % (ref 5–12)
NEUTROPHILS # BLD AUTO: 5.09 10*3/MM3 (ref 1.9–8.1)
NEUTROPHILS NFR BLD AUTO: 66 % (ref 42.7–76)
PLATELET # BLD AUTO: 324 10*3/MM3 (ref 140–500)
POTASSIUM SERPL-SCNC: 4.5 MMOL/L (ref 3.5–5.2)
PROT SERPL-MCNC: 6.5 G/DL (ref 6–8.5)
RBC # BLD AUTO: 4.58 10*6/MM3 (ref 3.9–5.2)
SODIUM SERPL-SCNC: 141 MMOL/L (ref 136–145)
T3RU NFR SERPL: 30 % (ref 24–39)
T4 SERPL-MCNC: 9.2 UG/DL (ref 4.5–12)
TRIGL SERPL-MCNC: 112 MG/DL (ref 0–150)
TSH SERPL DL<=0.005 MIU/L-ACNC: 2.9 UIU/ML (ref 0.45–4.5)
VLDLC SERPL CALC-MCNC: 22.4 MG/DL (ref 5–40)
WBC # BLD AUTO: 7.71 10*3/MM3 (ref 4.5–10.7)

## 2018-08-01 DIAGNOSIS — I10 ESSENTIAL HYPERTENSION: ICD-10-CM

## 2018-08-02 RX ORDER — HYDROCHLOROTHIAZIDE 25 MG/1
50 TABLET ORAL DAILY
Qty: 180 TABLET | Refills: 0 | Status: SHIPPED | OUTPATIENT
Start: 2018-08-02 | End: 2018-10-28 | Stop reason: SDUPTHER

## 2018-10-28 DIAGNOSIS — I10 ESSENTIAL HYPERTENSION: ICD-10-CM

## 2018-10-29 RX ORDER — HYDROCHLOROTHIAZIDE 25 MG/1
50 TABLET ORAL DAILY
Qty: 180 TABLET | Refills: 0 | Status: SHIPPED | OUTPATIENT
Start: 2018-10-29 | End: 2019-01-18 | Stop reason: SDUPTHER

## 2018-12-03 ENCOUNTER — OFFICE VISIT (OUTPATIENT)
Dept: FAMILY MEDICINE CLINIC | Facility: CLINIC | Age: 73
End: 2018-12-03

## 2018-12-03 VITALS
BODY MASS INDEX: 28.46 KG/M2 | TEMPERATURE: 98.3 F | WEIGHT: 160.6 LBS | DIASTOLIC BLOOD PRESSURE: 80 MMHG | HEART RATE: 91 BPM | SYSTOLIC BLOOD PRESSURE: 142 MMHG | OXYGEN SATURATION: 98 % | HEIGHT: 63 IN

## 2018-12-03 DIAGNOSIS — J32.9 SINUSITIS, UNSPECIFIED CHRONICITY, UNSPECIFIED LOCATION: Primary | ICD-10-CM

## 2018-12-03 DIAGNOSIS — J40 BRONCHITIS: ICD-10-CM

## 2018-12-03 PROCEDURE — 99213 OFFICE O/P EST LOW 20 MIN: CPT | Performed by: NURSE PRACTITIONER

## 2018-12-03 RX ORDER — METHYLPREDNISOLONE 4 MG/1
TABLET ORAL
Qty: 1 EACH | Refills: 0 | Status: SHIPPED | OUTPATIENT
Start: 2018-12-03 | End: 2019-07-18

## 2018-12-03 RX ORDER — AMOXICILLIN 875 MG/1
875 TABLET, COATED ORAL 2 TIMES DAILY
Qty: 20 TABLET | Refills: 0 | Status: SHIPPED | OUTPATIENT
Start: 2018-12-03 | End: 2019-07-18

## 2018-12-03 NOTE — PROGRESS NOTES
"Subjective   Jory Crowley is a 73 y.o. female.     History of Present Illness   Upper Respiratory Infection: Patient complains of symptoms of a URI, possible sinusitis. Symptoms include congestion, cough, irritability, plugged sensation in both ears, sore throat and swollen glands. Onset of symptoms was 10 days ago, gradually worsening since that time. She also c/o facial pain for the past 5 days .  She is drinking plenty of fluids. Evaluation to date: none. Treatment to date: antihistamines.       The following portions of the patient's history were reviewed and updated as appropriate: allergies, current medications, past social history and problem list.    Review of Systems   All other systems reviewed and are negative.      Objective   /80 (BP Location: Right arm, Patient Position: Sitting)   Pulse 91   Temp 98.3 °F (36.8 °C)   Ht 160 cm (62.99\")   Wt 72.8 kg (160 lb 9.6 oz)   SpO2 98%   BMI 28.46 kg/m²   Physical Exam   Constitutional: She is oriented to person, place, and time. Vital signs are normal. She appears well-developed and well-nourished. No distress.   HENT:   Head: Normocephalic.   Cardiovascular: Normal rate, regular rhythm and normal heart sounds.   Pulmonary/Chest: Effort normal and breath sounds normal.   Neurological: She is alert and oriented to person, place, and time. Gait normal.   Psychiatric: She has a normal mood and affect. Her behavior is normal. Judgment and thought content normal.   Vitals reviewed.      Assessment/Plan      Diagnosis Plan   1. Sinusitis, unspecified chronicity, unspecified location  amoxicillin (AMOXIL) 875 MG tablet   2. Bronchitis  MethylPREDNISolone (MEDROL, GIDEON,) 4 MG tablet     rto prn  Panchito Chavez, JANNETTE  12/3/2018    "

## 2019-01-18 ENCOUNTER — OFFICE VISIT (OUTPATIENT)
Dept: FAMILY MEDICINE CLINIC | Facility: CLINIC | Age: 74
End: 2019-01-18

## 2019-01-18 VITALS
OXYGEN SATURATION: 98 % | HEIGHT: 63 IN | HEART RATE: 78 BPM | WEIGHT: 161.5 LBS | RESPIRATION RATE: 14 BRPM | BODY MASS INDEX: 28.62 KG/M2 | TEMPERATURE: 98 F | DIASTOLIC BLOOD PRESSURE: 60 MMHG | SYSTOLIC BLOOD PRESSURE: 128 MMHG

## 2019-01-18 DIAGNOSIS — I10 ESSENTIAL HYPERTENSION: ICD-10-CM

## 2019-01-18 DIAGNOSIS — E03.9 ACQUIRED HYPOTHYROIDISM: ICD-10-CM

## 2019-01-18 DIAGNOSIS — L90.0 LICHEN SCLEROSUS: ICD-10-CM

## 2019-01-18 PROCEDURE — 99214 OFFICE O/P EST MOD 30 MIN: CPT | Performed by: FAMILY MEDICINE

## 2019-01-18 RX ORDER — LEVOTHYROXINE SODIUM 150 MCG
150 TABLET ORAL DAILY
Qty: 90 TABLET | Refills: 3 | Status: SHIPPED | OUTPATIENT
Start: 2019-01-18 | End: 2020-03-27

## 2019-01-18 RX ORDER — HYDROCHLOROTHIAZIDE 25 MG/1
50 TABLET ORAL DAILY
Qty: 180 TABLET | Refills: 3 | Status: SHIPPED | OUTPATIENT
Start: 2019-01-18 | End: 2019-12-30

## 2019-01-18 RX ORDER — CLOBETASOL PROPIONATE 0.5 MG/G
CREAM TOPICAL EVERY 12 HOURS SCHEDULED
Qty: 30 G | Refills: 3 | Status: SHIPPED | OUTPATIENT
Start: 2019-01-18 | End: 2019-11-07 | Stop reason: SDUPTHER

## 2019-01-18 NOTE — PROGRESS NOTES
Jory Crowley is a 73 y.o. female.     Chief Complaint   Patient presents with   • Hypothyroidism     Patient is having 6 months follow up.       HPI     Patient presents office today to follow-up on history of hypothyroidism, lichen sclerosus, and hypertension.  Currently taking and tolerating combination of clobetasol cream, hydrochlorothiazide, and Synthroid.  Tolerating well with no adverse side effects noted.  In need of refills.    The following portions of the patient's history were reviewed and updated as appropriate: allergies, current medications, past family history, past medical history, past social history, past surgical history and problem list.    Review of Systems   Constitutional: Positive for fatigue.   All other systems reviewed and are negative.      Objective  Vitals:    01/18/19 0931   BP: 128/60   Pulse: 78   Resp: 14   Temp: 98 °F (36.7 °C)   SpO2: 98%       Physical Exam   Constitutional: She is oriented to person, place, and time. She appears well-developed and well-nourished. No distress.   HENT:   Head: Normocephalic and atraumatic.   Right Ear: External ear normal.   Left Ear: External ear normal.   Nose: Nose normal.   Mouth/Throat: Oropharynx is clear and moist.   Eyes: Conjunctivae and EOM are normal. Pupils are equal, round, and reactive to light. Right eye exhibits no discharge. Left eye exhibits no discharge. No scleral icterus.   Cardiovascular: Normal rate, regular rhythm and normal heart sounds.   Pulmonary/Chest: Effort normal and breath sounds normal. No respiratory distress. She has no wheezes. She has no rales.   Abdominal: Soft. Bowel sounds are normal. She exhibits no distension. There is no tenderness.   Neurological: She is alert and oriented to person, place, and time.   Skin: Skin is warm and dry. She is not diaphoretic.   Nursing note and vitals reviewed.        Current Outpatient Medications:   •  alendronate (FOSAMAX) 70 MG tablet, Take 1 tablet by mouth Every  7 (Seven) Days., Disp: 12 tablet, Rfl: 3  •  amoxicillin (AMOXIL) 875 MG tablet, Take 1 tablet by mouth 2 (Two) Times a Day., Disp: 20 tablet, Rfl: 0  •  clobetasol (TEMOVATE) 0.05 % cream, Apply  topically to the appropriate area as directed Every 12 (Twelve) Hours., Disp: 30 g, Rfl: 3  •  hydrochlorothiazide (HYDRODIURIL) 25 MG tablet, Take 2 tablets by mouth Daily., Disp: 180 tablet, Rfl: 3  •  MethylPREDNISolone (MEDROL, GIDEON,) 4 MG tablet, Take as directed on package instructions., Disp: 1 each, Rfl: 0  •  SYNTHROID 150 MCG tablet, Take 1 tablet by mouth Daily., Disp: 90 tablet, Rfl: 3  Current outpatient and discharge medications have been reconciled for the patient.  Reviewed by: Derek Arellano MD      Procedures    Lab Results (most recent)     None                  Jory was seen today for hypothyroidism.    Diagnoses and all orders for this visit:    Acquired hypothyroidism  -     SYNTHROID 150 MCG tablet; Take 1 tablet by mouth Daily.    Lichen sclerosus  -     clobetasol (TEMOVATE) 0.05 % cream; Apply  topically to the appropriate area as directed Every 12 (Twelve) Hours.    Essential hypertension  -     hydrochlorothiazide (HYDRODIURIL) 25 MG tablet; Take 2 tablets by mouth Daily.    Refills given as above.  Stable on these current medications.  We'll follow-up in 6 months for Medicare wellness visit and blood work.      Return in about 6 months (around 7/18/2019) for Medicare wellness exam.      Derek Arellano MD

## 2019-06-16 DIAGNOSIS — M81.0 OSTEOPOROSIS, UNSPECIFIED OSTEOPOROSIS TYPE, UNSPECIFIED PATHOLOGICAL FRACTURE PRESENCE: ICD-10-CM

## 2019-06-17 RX ORDER — ALENDRONATE SODIUM 70 MG/1
TABLET ORAL
Qty: 12 TABLET | Refills: 0 | Status: SHIPPED | OUTPATIENT
Start: 2019-06-17 | End: 2019-09-06 | Stop reason: SDUPTHER

## 2019-07-18 ENCOUNTER — OFFICE VISIT (OUTPATIENT)
Dept: FAMILY MEDICINE CLINIC | Facility: CLINIC | Age: 74
End: 2019-07-18

## 2019-07-18 VITALS
HEIGHT: 63 IN | RESPIRATION RATE: 16 BRPM | TEMPERATURE: 98.3 F | DIASTOLIC BLOOD PRESSURE: 70 MMHG | BODY MASS INDEX: 29.84 KG/M2 | SYSTOLIC BLOOD PRESSURE: 126 MMHG | HEART RATE: 66 BPM | WEIGHT: 168.4 LBS | OXYGEN SATURATION: 98 %

## 2019-07-18 DIAGNOSIS — R53.83 FATIGUE, UNSPECIFIED TYPE: ICD-10-CM

## 2019-07-18 DIAGNOSIS — E78.5 HYPERLIPIDEMIA, UNSPECIFIED HYPERLIPIDEMIA TYPE: ICD-10-CM

## 2019-07-18 DIAGNOSIS — Z00.00 MEDICARE ANNUAL WELLNESS VISIT, SUBSEQUENT: Primary | ICD-10-CM

## 2019-07-18 DIAGNOSIS — E03.9 ACQUIRED HYPOTHYROIDISM: ICD-10-CM

## 2019-07-18 DIAGNOSIS — R73.01 IMPAIRED FASTING GLUCOSE: ICD-10-CM

## 2019-07-18 DIAGNOSIS — Z12.31 ENCOUNTER FOR SCREENING MAMMOGRAM FOR MALIGNANT NEOPLASM OF BREAST: ICD-10-CM

## 2019-07-18 DIAGNOSIS — E55.9 VITAMIN D DEFICIENCY: ICD-10-CM

## 2019-07-18 DIAGNOSIS — I10 ESSENTIAL HYPERTENSION: ICD-10-CM

## 2019-07-18 DIAGNOSIS — Z23 NEED FOR VACCINATION: ICD-10-CM

## 2019-07-18 DIAGNOSIS — Z23 NEED FOR PNEUMOCOCCAL VACCINE: ICD-10-CM

## 2019-07-18 DIAGNOSIS — D64.9 ANEMIA, UNSPECIFIED TYPE: ICD-10-CM

## 2019-07-18 PROCEDURE — G0009 ADMIN PNEUMOCOCCAL VACCINE: HCPCS | Performed by: FAMILY MEDICINE

## 2019-07-18 PROCEDURE — 96160 PT-FOCUSED HLTH RISK ASSMT: CPT | Performed by: FAMILY MEDICINE

## 2019-07-18 PROCEDURE — 90732 PPSV23 VACC 2 YRS+ SUBQ/IM: CPT | Performed by: FAMILY MEDICINE

## 2019-07-18 PROCEDURE — 99214 OFFICE O/P EST MOD 30 MIN: CPT | Performed by: FAMILY MEDICINE

## 2019-07-18 PROCEDURE — G0439 PPPS, SUBSEQ VISIT: HCPCS | Performed by: FAMILY MEDICINE

## 2019-07-18 NOTE — PATIENT INSTRUCTIONS
Medicare Wellness  Personal Prevention Plan of Service     Date of Office Visit:  2019  Encounter Provider:  Derek Arellano MD  Place of Service:  [facility]  Patient Name: Jory Crowlye  :  1945    As part of the Medicare Wellness portion of your visit today, we are providing you with this personalized preventive plan of services (PPPS). This plan is based upon recommendations of the [place] Preventive Services Task Force (USPSTF) and the Advisory Committee on Immunization Practices (ACIP).    This lists the preventive care services that should be considered, and provides dates of when you are due. Items listed as completed are up-to-date and do not require any further intervention.    Health Maintenance   Topic Date Due   • ZOSTER VACCINE (1 of 2) 10/27/1997   • PNEUMOCOCCAL VACCINES (65+ LOW/MEDIUM RISK) (2 of 2 - PPSV23) 2016   • HEPATITIS C SCREENING  2016   • MAMMOGRAM  2016   • DXA SCAN  2017   • INFLUENZA VACCINE  2019   • MEDICARE ANNUAL WELLNESS  2020   • LIPID PANEL  2020   • TDAP/TD VACCINES (2 - Td) 2023   • COLONOSCOPY  2026       Orders Placed This Encounter   Procedures   • Mammo Screening Bilateral With CAD     Order Specific Question:   Reason for Exam:     Answer:   Screening   • Comprehensive Metabolic Panel   • Lipid Panel   • Hemoglobin A1c   • Thyroid Panel With TSH   • CBC Auto Differential   • Vitamin B12   • Vitamin D 25 Hydroxy   • Iron and TIBC       Return for As needed.

## 2019-07-18 NOTE — PROGRESS NOTES
The ABCs of the Annual Wellness Visit  Subsequent Medicare Wellness Visit    Chief Complaint   Patient presents with   • Medicare Wellness-subsequent     patient needs MWV.        Subjective   History of Present Illness:  Jory Crowley is a 73 y.o. female who presents for a Subsequent Medicare Wellness Visit.    Patient is also here to follow-up on history hyperlipidemia, hypertension, hypothyroidism, elevated glucose levels.  She is been experiencing high levels of fatigue.  Does have a history of anemia and vitamin D deficiency as well.  Currently she is taking 150 mcg of levothyroxine along with hydrochlorothiazide.  Tries to maintain healthy diet and exercise regiment but this is been difficult due to the fatigue.    HEALTH RISK ASSESSMENT    Recent Hospitalizations:  No hospitalization(s) within the last year.    Current Medical Providers:  Patient Care Team:  Derek Arellano MD as PCP - General (Family Medicine)    Smoking Status:  Social History     Tobacco Use   Smoking Status Current Every Day Smoker   • Packs/day: 0.25   • Types: Cigarettes   Smokeless Tobacco Current User       Alcohol Consumption:  Social History     Substance and Sexual Activity   Alcohol Use Yes    Comment: occ       Depression Screen:   PHQ-2/PHQ-9 Depression Screening 7/18/2019   Little interest or pleasure in doing things 0   Feeling down, depressed, or hopeless 0   Trouble falling or staying asleep, or sleeping too much 0   Feeling tired or having little energy 3   Poor appetite or overeating 0   Feeling bad about yourself - or that you are a failure or have let yourself or your family down 0   Trouble concentrating on things, such as reading the newspaper or watching television 0   Moving or speaking so slowly that other people could have noticed. Or the opposite - being so fidgety or restless that you have been moving around a lot more than usual 0   Thoughts that you would be better off dead, or of hurting yourself in some way  0   Total Score 3   If you checked off any problems, how difficult have these problems made it for you to do your work, take care of things at home, or get along with other people? Not difficult at all       Fall Risk Screen:  LEBRON Fall Risk Assessment was completed, and patient is at LOW risk for falls.Assessment completed on:7/18/2019    Health Habits and Functional and Cognitive Screening:  Functional & Cognitive Status 7/18/2019   Do you have difficulty preparing food and eating? No   Do you have difficulty bathing yourself, getting dressed or grooming yourself? No   Do you have difficulty using the toilet? No   Do you have difficulty moving around from place to place? No   Do you have trouble with steps or getting out of a bed or a chair? No   Current Diet Well Balanced Diet   Dental Exam Up to date   Eye Exam Not up to date   Exercise (times per week) 7 times per week   Current Exercise Activities Include Walking   Do you need help using the phone?  No   Are you deaf or do you have serious difficulty hearing?  Yes   Do you need help with transportation? No   Do you need help shopping? No   Do you need help preparing meals?  No   Do you need help with housework?  No   Do you need help with laundry? No   Do you need help taking your medications? No   Do you need help managing money? No   Do you ever drive or ride in a car without wearing a seat belt? No   Have you felt unusual stress, anger or loneliness in the last month? -   Who do you live with? -   If you need help, do you have trouble finding someone available to you? -   Have you been bothered in the last four weeks by sexual problems? -   Do you have difficulty concentrating, remembering or making decisions? -         Does the patient have evidence of cognitive impairment? No    Asprin use counseling:Does not need ASA (and currently is not on it)    Age-appropriate Screening Schedule:  Refer to the list below for future screening recommendations based  on patient's age, sex and/or medical conditions. Orders for these recommended tests are listed in the plan section. The patient has been provided with a written plan.    Health Maintenance   Topic Date Due   • ZOSTER VACCINE (1 of 2) 10/27/1997   • PNEUMOCOCCAL VACCINES (65+ LOW/MEDIUM RISK) (2 of 2 - PPSV23) 07/02/2016   • MAMMOGRAM  07/11/2016   • DXA SCAN  07/08/2017   • INFLUENZA VACCINE  08/01/2019   • LIPID PANEL  07/18/2020   • TDAP/TD VACCINES (2 - Td) 03/27/2023   • COLONOSCOPY  01/12/2026          The following portions of the patient's history were reviewed and updated as appropriate: allergies, current medications, past family history, past medical history, past social history, past surgical history and problem list.    Outpatient Medications Prior to Visit   Medication Sig Dispense Refill   • alendronate (FOSAMAX) 70 MG tablet TAKE 1 TABLET BY MOUTH EVERY 7 DAYS 12 tablet 0   • clobetasol (TEMOVATE) 0.05 % cream Apply  topically to the appropriate area as directed Every 12 (Twelve) Hours. 30 g 3   • hydrochlorothiazide (HYDRODIURIL) 25 MG tablet Take 2 tablets by mouth Daily. 180 tablet 3   • SYNTHROID 150 MCG tablet Take 1 tablet by mouth Daily. 90 tablet 3   • amoxicillin (AMOXIL) 875 MG tablet Take 1 tablet by mouth 2 (Two) Times a Day. 20 tablet 0   • MethylPREDNISolone (MEDROL, GIDEON,) 4 MG tablet Take as directed on package instructions. 1 each 0     No facility-administered medications prior to visit.        Patient Active Problem List   Diagnosis   • Atopic rhinitis   • Contusion   • Hyperlipidemia   • HTN (hypertension)   • Hypothyroidism   • Impaired fasting glucose   • Osteoporosis   • Skin disorder   • Vitamin deficiency   • Irritable bowel syndrome with diarrhea   • Sinusitis   • Bronchitis       Advanced Care Planning:  Patient does not have an advance directive - information provided to the patient today    Review of Systems   Constitutional: Positive for fatigue.   All other systems  "reviewed and are negative.      Compared to one year ago, the patient feels her physical health is worse.  Compared to one year ago, the patient feels her mental health is the same.    Reviewed chart for potential of high risk medication in the elderly: yes  Reviewed chart for potential of harmful drug interactions in the elderly:yes    Objective         Vitals:    07/18/19 1001   BP: 126/70   Pulse: 66   Resp: 16   Temp: 98.3 °F (36.8 °C)   TempSrc: Oral   SpO2: 98%   Weight: 76.4 kg (168 lb 6.4 oz)   Height: 160 cm (62.99\")   PainSc: 0-No pain       Body mass index is 29.84 kg/m².  Discussed the patient's BMI with her. The BMI is above average; BMI management plan is completed.    Physical Exam   Constitutional: She is oriented to person, place, and time. She appears well-developed and well-nourished. No distress.   HENT:   Head: Normocephalic and atraumatic.   Right Ear: External ear normal.   Left Ear: External ear normal.   Nose: Nose normal.   Mouth/Throat: Oropharynx is clear and moist.   Eyes: Conjunctivae and EOM are normal. Pupils are equal, round, and reactive to light. Right eye exhibits no discharge. Left eye exhibits no discharge. No scleral icterus.   Cardiovascular: Normal rate, regular rhythm and normal heart sounds.   Pulmonary/Chest: Effort normal and breath sounds normal. No respiratory distress. She has no wheezes. She has no rales.   Abdominal: Soft. Bowel sounds are normal. She exhibits no distension. There is no tenderness.   Neurological: She is alert and oriented to person, place, and time.   Skin: Skin is warm and dry. She is not diaphoretic.   Nursing note and vitals reviewed.            Assessment/Plan   Medicare Risks and Personalized Health Plan  CMS Preventative Services Quick Reference  Advance Directive Discussion  Breast Cancer/Mammogram Screening  Diabetic Lab Screening   None    The above risks/problems have been discussed with the patient.  Pertinent information has been shared " with the patient in the After Visit Summary.  Follow up plans and orders are seen below in the Assessment/Plan Section.    Diagnoses and all orders for this visit:    1. Medicare annual wellness visit, subsequent (Primary)  -     Comprehensive Metabolic Panel  -     Lipid Panel  -     Hemoglobin A1c  -     Thyroid Panel With TSH  -     CBC Auto Differential  -     Vitamin B12  -     Vitamin D 25 Hydroxy  -     Iron and TIBC    2. Need for pneumococcal vaccine  -     Comprehensive Metabolic Panel  -     Lipid Panel  -     Hemoglobin A1c  -     Thyroid Panel With TSH  -     CBC Auto Differential  -     Vitamin B12  -     Vitamin D 25 Hydroxy  -     Iron and TIBC    3. Encounter for screening mammogram for malignant neoplasm of breast  -     Mammo Screening Bilateral With CAD  -     Comprehensive Metabolic Panel  -     Lipid Panel  -     Hemoglobin A1c  -     Thyroid Panel With TSH  -     CBC Auto Differential  -     Vitamin B12  -     Vitamin D 25 Hydroxy  -     Iron and TIBC    4. Hyperlipidemia, unspecified hyperlipidemia type  -     Comprehensive Metabolic Panel  -     Lipid Panel  -     Hemoglobin A1c  -     Thyroid Panel With TSH  -     CBC Auto Differential  -     Vitamin B12  -     Vitamin D 25 Hydroxy  -     Iron and TIBC    5. Essential hypertension  -     Comprehensive Metabolic Panel  -     Lipid Panel  -     Hemoglobin A1c  -     Thyroid Panel With TSH  -     CBC Auto Differential  -     Vitamin B12  -     Vitamin D 25 Hydroxy  -     Iron and TIBC    6. Acquired hypothyroidism  -     Comprehensive Metabolic Panel  -     Lipid Panel  -     Hemoglobin A1c  -     Thyroid Panel With TSH  -     CBC Auto Differential  -     Vitamin B12  -     Vitamin D 25 Hydroxy  -     Iron and TIBC    7. Impaired fasting glucose  -     Comprehensive Metabolic Panel  -     Lipid Panel  -     Hemoglobin A1c  -     Thyroid Panel With TSH  -     CBC Auto Differential  -     Vitamin B12  -     Vitamin D 25 Hydroxy  -     Iron and  TIBC    8. Fatigue, unspecified type  -     Comprehensive Metabolic Panel  -     Lipid Panel  -     Hemoglobin A1c  -     Thyroid Panel With TSH  -     CBC Auto Differential  -     Vitamin B12  -     Vitamin D 25 Hydroxy  -     Iron and TIBC    9. Anemia, unspecified type  -     Comprehensive Metabolic Panel  -     Lipid Panel  -     Hemoglobin A1c  -     Thyroid Panel With TSH  -     CBC Auto Differential  -     Vitamin B12  -     Vitamin D 25 Hydroxy  -     Iron and TIBC    10. Vitamin D deficiency  -     Comprehensive Metabolic Panel  -     Lipid Panel  -     Hemoglobin A1c  -     Thyroid Panel With TSH  -     CBC Auto Differential  -     Vitamin B12  -     Vitamin D 25 Hydroxy  -     Iron and TIBC    Labs as above.  We will do this to evaluate the status of these chronic medical problems as well as to evaluate for underlying causes of her current issues.      Follow Up:  Return for As needed.     An After Visit Summary and PPPS were given to the patient.

## 2019-07-19 LAB
25(OH)D3+25(OH)D2 SERPL-MCNC: 24.1 NG/ML (ref 30–100)
ALBUMIN SERPL-MCNC: 4.4 G/DL (ref 3.5–5.2)
ALBUMIN/GLOB SERPL: 1.9 G/DL
ALP SERPL-CCNC: 101 U/L (ref 39–117)
ALT SERPL-CCNC: 23 U/L (ref 1–33)
AST SERPL-CCNC: 18 U/L (ref 1–32)
BASOPHILS # BLD AUTO: 0.04 10*3/MM3 (ref 0–0.2)
BASOPHILS NFR BLD AUTO: 0.6 % (ref 0–1.5)
BILIRUB SERPL-MCNC: 0.3 MG/DL (ref 0.2–1.2)
BUN SERPL-MCNC: 13 MG/DL (ref 8–23)
BUN/CREAT SERPL: 27.7 (ref 7–25)
CALCIUM SERPL-MCNC: 8.7 MG/DL (ref 8.6–10.5)
CHLORIDE SERPL-SCNC: 102 MMOL/L (ref 98–107)
CHOLEST SERPL-MCNC: 214 MG/DL (ref 0–200)
CO2 SERPL-SCNC: 26.6 MMOL/L (ref 22–29)
CREAT SERPL-MCNC: 0.47 MG/DL (ref 0.57–1)
EOSINOPHIL # BLD AUTO: 0.29 10*3/MM3 (ref 0–0.4)
EOSINOPHIL NFR BLD AUTO: 4.5 % (ref 0.3–6.2)
ERYTHROCYTE [DISTWIDTH] IN BLOOD BY AUTOMATED COUNT: 12.8 % (ref 12.3–15.4)
FT4I SERPL CALC-MCNC: 2.9 (ref 1.2–4.9)
GLOBULIN SER CALC-MCNC: 2.3 GM/DL
GLUCOSE SERPL-MCNC: 92 MG/DL (ref 65–99)
HBA1C MFR BLD: 5.63 % (ref 4.8–5.6)
HCT VFR BLD AUTO: 43.8 % (ref 34–46.6)
HDLC SERPL-MCNC: 56 MG/DL (ref 40–60)
HGB BLD-MCNC: 13.8 G/DL (ref 12–15.9)
IMM GRANULOCYTES # BLD AUTO: 0.02 10*3/MM3 (ref 0–0.05)
IMM GRANULOCYTES NFR BLD AUTO: 0.3 % (ref 0–0.5)
IRON SATN MFR SERPL: 22 % (ref 20–50)
IRON SERPL-MCNC: 90 MCG/DL (ref 37–145)
LDLC SERPL CALC-MCNC: 140 MG/DL (ref 0–100)
LYMPHOCYTES # BLD AUTO: 1.67 10*3/MM3 (ref 0.7–3.1)
LYMPHOCYTES NFR BLD AUTO: 26.1 % (ref 19.6–45.3)
MCH RBC QN AUTO: 31.9 PG (ref 26.6–33)
MCHC RBC AUTO-ENTMCNC: 31.5 G/DL (ref 31.5–35.7)
MCV RBC AUTO: 101.2 FL (ref 79–97)
MONOCYTES # BLD AUTO: 0.52 10*3/MM3 (ref 0.1–0.9)
MONOCYTES NFR BLD AUTO: 8.1 % (ref 5–12)
NEUTROPHILS # BLD AUTO: 3.85 10*3/MM3 (ref 1.7–7)
NEUTROPHILS NFR BLD AUTO: 60.4 % (ref 42.7–76)
NRBC BLD AUTO-RTO: 0 /100 WBC (ref 0–0.2)
PLATELET # BLD AUTO: 312 10*3/MM3 (ref 140–450)
POTASSIUM SERPL-SCNC: 4.5 MMOL/L (ref 3.5–5.2)
PROT SERPL-MCNC: 6.7 G/DL (ref 6–8.5)
RBC # BLD AUTO: 4.33 10*6/MM3 (ref 3.77–5.28)
SODIUM SERPL-SCNC: 140 MMOL/L (ref 136–145)
T3RU NFR SERPL: 33 % (ref 24–39)
T4 SERPL-MCNC: 8.9 UG/DL (ref 4.5–12)
TIBC SERPL-MCNC: 409 MCG/DL
TRIGL SERPL-MCNC: 91 MG/DL (ref 0–150)
TSH SERPL DL<=0.005 MIU/L-ACNC: 3.21 UIU/ML (ref 0.45–4.5)
UIBC SERPL-MCNC: 319 MCG/DL (ref 112–346)
VIT B12 SERPL-MCNC: 383 PG/ML (ref 211–946)
VLDLC SERPL CALC-MCNC: 18.2 MG/DL (ref 5–40)
WBC # BLD AUTO: 6.39 10*3/MM3 (ref 3.4–10.8)

## 2019-07-22 ENCOUNTER — TRANSCRIBE ORDERS (OUTPATIENT)
Dept: ADMINISTRATIVE | Facility: HOSPITAL | Age: 74
End: 2019-07-22

## 2019-07-22 DIAGNOSIS — Z12.31 VISIT FOR SCREENING MAMMOGRAM: Primary | ICD-10-CM

## 2019-07-24 RX ORDER — ERGOCALCIFEROL 1.25 MG/1
50000 CAPSULE ORAL WEEKLY
Qty: 12 CAPSULE | Refills: 3 | Status: SHIPPED | OUTPATIENT
Start: 2019-07-24 | End: 2020-06-09

## 2019-07-31 ENCOUNTER — HOSPITAL ENCOUNTER (OUTPATIENT)
Dept: MAMMOGRAPHY | Facility: HOSPITAL | Age: 74
Discharge: HOME | End: 2019-07-31
Admitting: FAMILY MEDICINE

## 2019-07-31 DIAGNOSIS — Z12.31 VISIT FOR SCREENING MAMMOGRAM: ICD-10-CM

## 2019-07-31 PROCEDURE — 77067 SCR MAMMO BI INCL CAD: CPT

## 2019-07-31 PROCEDURE — 77063 BREAST TOMOSYNTHESIS BI: CPT

## 2019-09-06 DIAGNOSIS — M81.0 OSTEOPOROSIS, UNSPECIFIED OSTEOPOROSIS TYPE, UNSPECIFIED PATHOLOGICAL FRACTURE PRESENCE: ICD-10-CM

## 2019-09-06 RX ORDER — ALENDRONATE SODIUM 70 MG/1
TABLET ORAL
Qty: 12 TABLET | Refills: 0 | Status: SHIPPED | OUTPATIENT
Start: 2019-09-06 | End: 2019-11-29 | Stop reason: SDUPTHER

## 2019-11-07 DIAGNOSIS — L90.0 LICHEN SCLEROSUS: ICD-10-CM

## 2019-11-07 RX ORDER — CLOBETASOL PROPIONATE 0.5 MG/G
CREAM TOPICAL EVERY 12 HOURS SCHEDULED
Qty: 30 G | Refills: 3 | Status: SHIPPED | OUTPATIENT
Start: 2019-11-07 | End: 2020-06-17

## 2019-11-29 DIAGNOSIS — M81.0 OSTEOPOROSIS, UNSPECIFIED OSTEOPOROSIS TYPE, UNSPECIFIED PATHOLOGICAL FRACTURE PRESENCE: ICD-10-CM

## 2019-12-02 RX ORDER — ALENDRONATE SODIUM 70 MG/1
TABLET ORAL
Qty: 12 TABLET | Refills: 0 | Status: SHIPPED | OUTPATIENT
Start: 2019-12-02 | End: 2020-02-25

## 2019-12-30 DIAGNOSIS — I10 ESSENTIAL HYPERTENSION: ICD-10-CM

## 2019-12-30 RX ORDER — HYDROCHLOROTHIAZIDE 25 MG/1
50 TABLET ORAL DAILY
Qty: 180 TABLET | Refills: 3 | Status: SHIPPED | OUTPATIENT
Start: 2019-12-30 | End: 2020-09-17

## 2020-02-23 DIAGNOSIS — M81.0 OSTEOPOROSIS, UNSPECIFIED OSTEOPOROSIS TYPE, UNSPECIFIED PATHOLOGICAL FRACTURE PRESENCE: ICD-10-CM

## 2020-02-25 RX ORDER — ALENDRONATE SODIUM 70 MG/1
TABLET ORAL
Qty: 12 TABLET | Refills: 0 | Status: SHIPPED | OUTPATIENT
Start: 2020-02-25 | End: 2020-05-18

## 2020-03-27 DIAGNOSIS — E03.9 ACQUIRED HYPOTHYROIDISM: ICD-10-CM

## 2020-03-27 RX ORDER — LEVOTHYROXINE SODIUM 150 MCG
150 TABLET ORAL DAILY
Qty: 90 TABLET | Refills: 3 | Status: SHIPPED | OUTPATIENT
Start: 2020-03-27 | End: 2020-09-18 | Stop reason: SDUPTHER

## 2020-05-17 DIAGNOSIS — M81.0 OSTEOPOROSIS, UNSPECIFIED OSTEOPOROSIS TYPE, UNSPECIFIED PATHOLOGICAL FRACTURE PRESENCE: ICD-10-CM

## 2020-05-18 RX ORDER — ALENDRONATE SODIUM 70 MG/1
TABLET ORAL
Qty: 12 TABLET | Refills: 0 | Status: SHIPPED | OUTPATIENT
Start: 2020-05-18 | End: 2020-09-17

## 2020-06-09 RX ORDER — ERGOCALCIFEROL 1.25 MG/1
CAPSULE ORAL
Qty: 12 CAPSULE | Refills: 3 | Status: SHIPPED | OUTPATIENT
Start: 2020-06-09 | End: 2021-09-21

## 2020-06-17 DIAGNOSIS — L90.0 LICHEN SCLEROSUS: ICD-10-CM

## 2020-06-17 RX ORDER — CLOBETASOL PROPIONATE 0.5 MG/G
CREAM TOPICAL
Qty: 30 G | Refills: 3 | Status: SHIPPED | OUTPATIENT
Start: 2020-06-17 | End: 2020-09-18 | Stop reason: SDUPTHER

## 2020-08-07 DIAGNOSIS — M81.0 OSTEOPOROSIS, UNSPECIFIED OSTEOPOROSIS TYPE, UNSPECIFIED PATHOLOGICAL FRACTURE PRESENCE: ICD-10-CM

## 2020-08-07 RX ORDER — ALENDRONATE SODIUM 70 MG/1
TABLET ORAL
Qty: 12 TABLET | Refills: 0 | OUTPATIENT
Start: 2020-08-07

## 2020-09-17 ENCOUNTER — OFFICE VISIT (OUTPATIENT)
Dept: INTERNAL MEDICINE | Facility: CLINIC | Age: 75
End: 2020-09-17

## 2020-09-17 VITALS
WEIGHT: 168.4 LBS | OXYGEN SATURATION: 98 % | DIASTOLIC BLOOD PRESSURE: 78 MMHG | SYSTOLIC BLOOD PRESSURE: 132 MMHG | HEART RATE: 81 BPM | BODY MASS INDEX: 29.84 KG/M2

## 2020-09-17 DIAGNOSIS — M81.0 OSTEOPOROSIS, UNSPECIFIED OSTEOPOROSIS TYPE, UNSPECIFIED PATHOLOGICAL FRACTURE PRESENCE: ICD-10-CM

## 2020-09-17 DIAGNOSIS — E78.5 HYPERLIPIDEMIA, UNSPECIFIED HYPERLIPIDEMIA TYPE: ICD-10-CM

## 2020-09-17 DIAGNOSIS — Z23 NEED FOR IMMUNIZATION AGAINST INFLUENZA: ICD-10-CM

## 2020-09-17 DIAGNOSIS — R73.01 IMPAIRED FASTING GLUCOSE: ICD-10-CM

## 2020-09-17 DIAGNOSIS — E03.9 ACQUIRED HYPOTHYROIDISM: ICD-10-CM

## 2020-09-17 DIAGNOSIS — I10 ESSENTIAL HYPERTENSION: Primary | ICD-10-CM

## 2020-09-17 PROBLEM — J40 BRONCHITIS: Status: RESOLVED | Noted: 2018-12-03 | Resolved: 2020-09-17

## 2020-09-17 PROBLEM — H91.90 HEARING LOSS: Status: ACTIVE | Noted: 2020-09-17

## 2020-09-17 PROBLEM — J32.9 SINUSITIS: Status: RESOLVED | Noted: 2018-12-03 | Resolved: 2020-09-17

## 2020-09-17 PROCEDURE — G0008 ADMIN INFLUENZA VIRUS VAC: HCPCS | Performed by: FAMILY MEDICINE

## 2020-09-17 PROCEDURE — 90686 IIV4 VACC NO PRSV 0.5 ML IM: CPT | Performed by: FAMILY MEDICINE

## 2020-09-17 PROCEDURE — 96160 PT-FOCUSED HLTH RISK ASSMT: CPT | Performed by: FAMILY MEDICINE

## 2020-09-17 PROCEDURE — G0439 PPPS, SUBSEQ VISIT: HCPCS | Performed by: FAMILY MEDICINE

## 2020-09-17 NOTE — PROGRESS NOTES
Date of Encounter: 2020  Patient: Jory Crowley,  1945    SUBSEQUENT MEDICARE WELLNESS VISIT  Subjective   History of Presenting Illness  Chief complaint: Medicare wellness    Hypertension: Stable off of hydrochlorothiazide    Hyperlipidemia: History of severe LFT elevations on statins, not interested in medication at this time.    Hypothyroidism: Stable    Osteoporosis: Has been on alendronate for over 15 years without side effects.  Last DEXA .    Impaired fasting glucose: Last A1c normal    Hearing loss not interested in hearing aids due to pandemic    Lives with .  Prior 26-pack-year smoker quit at age 73.  Rare alcohol use.  Exercises daily by walking her dog.  Tries to avoid red meat in her diet.  Retired teacher.  Does not have a living will.  Adverse reaction with high-dose influenza vaccine.    Review of Systems:  Negative for fever, congestion, chest pain upon exertion, shortness of breath, vision changes, vomiting, dysuria, lymphadenopathy, muscle weakness, numbness, mood changes, rashes.    Health Risk Assessment:    Recent Hospitalizations:  No hospitalization(s) within the last year.    Current Medical Providers:  Patient Care Team:  Gerald Mosquera MD as PCP - General (Family Medicine)    Smoking Status:  Social History     Tobacco Use   Smoking Status Former Smoker   • Packs/day: 0.25   • Types: Cigarettes   Smokeless Tobacco Former User       Alcohol Consumption:  Social History     Substance and Sexual Activity   Alcohol Use Yes    Comment: occ       Depression Screen:   PHQ-2/PHQ-9 Depression Screening 2020   Little interest or pleasure in doing things 0   Feeling down, depressed, or hopeless 0   Trouble falling or staying asleep, or sleeping too much -   Feeling tired or having little energy -   Poor appetite or overeating -   Feeling bad about yourself - or that you are a failure or have let yourself or your family down -   Trouble concentrating on things, such as  reading the newspaper or watching television -   Moving or speaking so slowly that other people could have noticed. Or the opposite - being so fidgety or restless that you have been moving around a lot more than usual -   Thoughts that you would be better off dead, or of hurting yourself in some way -   Total Score 0   If you checked off any problems, how difficult have these problems made it for you to do your work, take care of things at home, or get along with other people? -     I spent more than 16 minutes asking patient questions, counseling and documenting in the chart    Fall Risk Screen:  LEBRON Fall Risk Assessment was completed, and patient is at LOW risk for falls.Assessment completed on:9/17/2020    Health Habits and Functional and Cognitive Screening:  Functional & Cognitive Status 7/18/2019   Do you have difficulty preparing food and eating? No   Do you have difficulty bathing yourself, getting dressed or grooming yourself? No   Do you have difficulty using the toilet? No   Do you have difficulty moving around from place to place? No   Do you have trouble with steps or getting out of a bed or a chair? No   Current Diet Well Balanced Diet   Dental Exam Up to date   Eye Exam Not up to date   Exercise (times per week) 7 times per week   Current Exercise Activities Include Walking   Do you need help using the phone?  No   Are you deaf or do you have serious difficulty hearing?  Yes   Do you need help with transportation? No   Do you need help shopping? No   Do you need help preparing meals?  No   Do you need help with housework?  No   Do you need help with laundry? No   Do you need help taking your medications? No   Do you need help managing money? No   Do you ever drive or ride in a car without wearing a seat belt? No   Have you felt unusual stress, anger or loneliness in the last month? -   Who do you live with? -   If you need help, do you have trouble finding someone available to you? -   Have you  been bothered in the last four weeks by sexual problems? -   Do you have difficulty concentrating, remembering or making decisions? -       Does the patient have evidence of cognitive impairment? No    Aspirin use counseling:Does not need ASA (and currently is not on it)    Recent Lab Results:        Age-appropriate Screening Schedule:  Refer to the list below for future screening recommendations based on patient's age, sex and/or medical conditions. Orders for these recommended tests are listed in the plan section. The patient has been provided with a written plan.    Health Maintenance   Topic Date Due   • ZOSTER VACCINE (1 of 2) 10/27/1997   • LIPID PANEL  07/18/2020   • INFLUENZA VACCINE  08/01/2020   • MAMMOGRAM  07/31/2021   • TDAP/TD VACCINES (2 - Td) 03/27/2023   • COLONOSCOPY  01/12/2026   • DXA SCAN  Discontinued       Compared to one year ago, the patient feels her physical health is the same.  Compared to one year ago, the patient feels her mental health is the same.    Reviewed chart for potential of high risk medication in the elderly: yes  Reviewed chart for potential of harmful drug interactions in the elderly:yes    Advanced Care Planning:  ACP discussion was held with the patient during this visit. Patient does not have an advance directive, information provided.    A face-to-face visit was completed today with patient.  Counseling explanation, and discussion of advanced directives was performed.   In a near life ending situation, from which she is not expected to recover functionally, and she is not able to speak for her, she does not want life sustaining measures. We discussed feeding tubes, ventilators and cardiac support as well as dialysis.    I spent less than 16 minutes discussing Advanced Care Planning information and documenting in the chart.      The following portions of the patient's history were reviewed and updated as appropriate: allergies, current medications, past family history,  past medical history, past social history, past surgical history and problem list.    Patient Active Problem List   Diagnosis   • Atopic rhinitis   • Hyperlipidemia   • HTN (hypertension)   • Hypothyroidism   • Impaired fasting glucose   • Osteoporosis   • Vitamin deficiency   • Irritable bowel syndrome with diarrhea     Past Medical History:   Diagnosis Date   • Breast cyst    • Hyperlipidemia    • Hypertension    • Hypothyroidism    • Irritable bowel syndrome with diarrhea 6/22/2018   • Osteopenia    • Osteoporosis      Past Surgical History:   Procedure Laterality Date   • BREAST CYST EXCISION     • CHOLECYSTECTOMY     • HYSTERECTOMY     • TONSILLECTOMY       Family History   Problem Relation Age of Onset   • Heart attack Mother    • Heart disease Mother    • Thyroid disease Mother    • Heart disease Father    • Breast cancer Maternal Grandmother    • Leukemia Brother    • Thyroid disease Brother        Current Outpatient Medications:   •  clobetasol (TEMOVATE) 0.05 % cream, APPLY TO THE AFFECTED AREA EVERY 12 HOURS, Disp: 30 g, Rfl: 3  •  SYNTHROID 150 MCG tablet, TAKE 1 TABLET BY MOUTH DAILY, Disp: 90 tablet, Rfl: 3  •  vitamin D (ERGOCALCIFEROL) 1.25 MG (33058 UT) capsule capsule, TAKE 1 CAPSULE BY MOUTH 1 TIME EVERY WEEK, Disp: 12 capsule, Rfl: 3  Allergies   Allergen Reactions   • Codeine    • Influenza Vaccines Other (See Comments)     HIGH DOSE FLU SHOT - regular dose is fine   • Nabumetone    • Propoxyphene      Social History     Tobacco Use   • Smoking status: Former Smoker     Packs/day: 0.25     Types: Cigarettes   • Smokeless tobacco: Former User   Substance Use Topics   • Alcohol use: Yes     Comment: occ   • Drug use: Never          Objective   Physical Exam  Vitals:    09/17/20 1109   BP: 132/78   Pulse: 81   SpO2: 98%   Weight: 76.4 kg (168 lb 6.4 oz)     Body mass index is 29.84 kg/m².  Discussed the patient's BMI with her. The BMI is in the acceptable range.    Pertinent  findings:  Constitutional: NAD.  Eyes: EOMI. PERRLA. Normal conjunctiva.  Ear, nose, mouth, throat: No tonsillar exudates or erythema.   Normal nasal mucosa. Normal external ear canals and TMs bilaterally.  Cardiovascular: RRR. No murmurs. No LE edema b/l. Radial pulses 2+ bilaterally.  Pulmonary: CTA b/l. Good effort.  Integumentary: No lacerations or wounds on face or upper extremities.  Lymphatic: No anterior cervical lymphadenopathy.  Endocrine: No thyromegaly or palpable thyroid nodules.  Psychiatric: Normal affect. Normal thought content.     Assessment/Plan   Assessment and Plan  Pleasant 74-year-old female with hypertension, hyperlipidemia, IBS, vitamin D deficiency, hypothyroidism, osteoporosis who presents with the following:  Advance Directive Discussion  Immunizations Discussed/Encouraged (specific immunizations; Influenza and Shingrix )    The above risks/problems have been discussed with the patient.  Pertinent information has been shared with the patient in the After Visit Summary.  Other plans as below:    Diagnoses and all orders for this visit:    1. Essential hypertension (Primary): Controlled  -     Comprehensive Metabolic Panel  -     CBC & Differential    2. Hyperlipidemia, unspecified hyperlipidemia type: No medical intervention due to history of LFT elevations  -     Lipid Panel    3. Acquired hypothyroidism: Stable  -     Thyroid Panel With TSH    4. Osteoporosis, unspecified osteoporosis type, unspecified pathological fracture presence: She has had over 15 years of alendronate, at this point there are no significant benefits to continuing the medication.    5. Impaired fasting glucose  -     Hemoglobin A1c    Return to office in 1 year for annual physical or earlier as needed.    An After Visit Summary and PPPS were given to the patient.      Gerald Mosquera MD  Family Medicine  O: [phone]  C: [phone]    Disclaimer: Parts of this note were dictated by speech recognition. Minor errors  in transcription may be present. Please call if questions.

## 2020-09-18 DIAGNOSIS — L90.0 LICHEN SCLEROSUS: ICD-10-CM

## 2020-09-18 DIAGNOSIS — E03.9 ACQUIRED HYPOTHYROIDISM: ICD-10-CM

## 2020-09-18 DIAGNOSIS — E56.9 VITAMIN DEFICIENCY: Primary | ICD-10-CM

## 2020-09-18 LAB
ALBUMIN SERPL-MCNC: 4.4 G/DL (ref 3.7–4.7)
ALBUMIN/GLOB SERPL: 1.7 {RATIO} (ref 1.2–2.2)
ALP SERPL-CCNC: 116 IU/L (ref 39–117)
ALT SERPL-CCNC: 26 IU/L (ref 0–32)
AST SERPL-CCNC: 19 IU/L (ref 0–40)
BASOPHILS # BLD AUTO: 0.1 X10E3/UL (ref 0–0.2)
BASOPHILS NFR BLD AUTO: 1 %
BILIRUB SERPL-MCNC: 0.3 MG/DL (ref 0–1.2)
BUN SERPL-MCNC: 14 MG/DL (ref 8–27)
BUN/CREAT SERPL: 20 (ref 12–28)
CALCIUM SERPL-MCNC: 9.5 MG/DL (ref 8.7–10.3)
CHLORIDE SERPL-SCNC: 102 MMOL/L (ref 96–106)
CHOLEST SERPL-MCNC: 223 MG/DL (ref 100–199)
CO2 SERPL-SCNC: 26 MMOL/L (ref 20–29)
CREAT SERPL-MCNC: 0.7 MG/DL (ref 0.57–1)
EOSINOPHIL # BLD AUTO: 0.2 X10E3/UL (ref 0–0.4)
EOSINOPHIL NFR BLD AUTO: 2 %
ERYTHROCYTE [DISTWIDTH] IN BLOOD BY AUTOMATED COUNT: 12.5 % (ref 11.7–15.4)
FT4I SERPL CALC-MCNC: 3.1 (ref 1.2–4.9)
GLOBULIN SER CALC-MCNC: 2.6 G/DL (ref 1.5–4.5)
GLUCOSE SERPL-MCNC: 97 MG/DL (ref 65–99)
HBA1C MFR BLD: 5.6 % (ref 4.8–5.6)
HCT VFR BLD AUTO: 44.4 % (ref 34–46.6)
HDLC SERPL-MCNC: 60 MG/DL
HGB BLD-MCNC: 15.3 G/DL (ref 11.1–15.9)
IMM GRANULOCYTES # BLD AUTO: 0 X10E3/UL (ref 0–0.1)
IMM GRANULOCYTES NFR BLD AUTO: 0 %
LDLC SERPL CALC-MCNC: 143 MG/DL (ref 0–99)
LYMPHOCYTES # BLD AUTO: 1.9 X10E3/UL (ref 0.7–3.1)
LYMPHOCYTES NFR BLD AUTO: 26 %
MCH RBC QN AUTO: 33.3 PG (ref 26.6–33)
MCHC RBC AUTO-ENTMCNC: 34.5 G/DL (ref 31.5–35.7)
MCV RBC AUTO: 97 FL (ref 79–97)
MONOCYTES # BLD AUTO: 0.5 X10E3/UL (ref 0.1–0.9)
MONOCYTES NFR BLD AUTO: 7 %
NEUTROPHILS # BLD AUTO: 4.8 X10E3/UL (ref 1.4–7)
NEUTROPHILS NFR BLD AUTO: 64 %
PLATELET # BLD AUTO: 317 X10E3/UL (ref 150–450)
POTASSIUM SERPL-SCNC: 5 MMOL/L (ref 3.5–5.2)
PROT SERPL-MCNC: 7 G/DL (ref 6–8.5)
RBC # BLD AUTO: 4.6 X10E6/UL (ref 3.77–5.28)
SODIUM SERPL-SCNC: 141 MMOL/L (ref 134–144)
T3RU NFR SERPL: 32 % (ref 24–39)
T4 SERPL-MCNC: 9.6 UG/DL (ref 4.5–12)
TRIGL SERPL-MCNC: 114 MG/DL (ref 0–149)
TSH SERPL DL<=0.005 MIU/L-ACNC: 3.41 UIU/ML (ref 0.45–4.5)
VLDLC SERPL CALC-MCNC: 20 MG/DL (ref 5–40)
WBC # BLD AUTO: 7.4 X10E3/UL (ref 3.4–10.8)

## 2020-09-18 RX ORDER — VITAMIN B COMPLEX
TABLET ORAL
Qty: 90 TABLET | Refills: 3 | Status: SHIPPED | OUTPATIENT
Start: 2020-09-18 | End: 2021-11-08 | Stop reason: SDUPTHER

## 2020-09-18 RX ORDER — LEVOTHYROXINE SODIUM 150 MCG
150 TABLET ORAL DAILY
Qty: 90 TABLET | Refills: 3 | Status: SHIPPED | OUTPATIENT
Start: 2020-09-18 | End: 2021-09-08

## 2020-09-18 RX ORDER — CLOBETASOL PROPIONATE 0.5 MG/G
CREAM TOPICAL DAILY
Qty: 30 G | Refills: 3 | Status: SHIPPED | OUTPATIENT
Start: 2020-09-18 | End: 2021-03-02

## 2021-03-02 DIAGNOSIS — L90.0 LICHEN SCLEROSUS: ICD-10-CM

## 2021-03-02 RX ORDER — CLOBETASOL PROPIONATE 0.5 MG/G
CREAM TOPICAL
Qty: 30 G | Refills: 3 | Status: SHIPPED | OUTPATIENT
Start: 2021-03-02 | End: 2021-06-28

## 2021-03-09 DIAGNOSIS — Z23 IMMUNIZATION DUE: ICD-10-CM

## 2021-06-28 DIAGNOSIS — L90.0 LICHEN SCLEROSUS: ICD-10-CM

## 2021-06-28 RX ORDER — CLOBETASOL PROPIONATE 0.5 MG/G
CREAM TOPICAL
Qty: 30 G | Refills: 3 | Status: SHIPPED | OUTPATIENT
Start: 2021-06-28 | End: 2021-09-21 | Stop reason: SDUPTHER

## 2021-09-08 DIAGNOSIS — E03.9 ACQUIRED HYPOTHYROIDISM: ICD-10-CM

## 2021-09-08 RX ORDER — LEVOTHYROXINE SODIUM 150 MCG
150 TABLET ORAL DAILY
Qty: 90 TABLET | Refills: 3 | Status: SHIPPED | OUTPATIENT
Start: 2021-09-08 | End: 2022-08-31

## 2021-09-21 ENCOUNTER — OFFICE VISIT (OUTPATIENT)
Dept: INTERNAL MEDICINE | Facility: CLINIC | Age: 76
End: 2021-09-21

## 2021-09-21 VITALS
TEMPERATURE: 97.3 F | SYSTOLIC BLOOD PRESSURE: 122 MMHG | HEART RATE: 77 BPM | BODY MASS INDEX: 29.1 KG/M2 | DIASTOLIC BLOOD PRESSURE: 86 MMHG | OXYGEN SATURATION: 97 % | WEIGHT: 164.2 LBS

## 2021-09-21 DIAGNOSIS — M81.0 OSTEOPOROSIS, UNSPECIFIED OSTEOPOROSIS TYPE, UNSPECIFIED PATHOLOGICAL FRACTURE PRESENCE: ICD-10-CM

## 2021-09-21 DIAGNOSIS — Z00.00 ENCOUNTER FOR SUBSEQUENT ANNUAL WELLNESS VISIT (AWV) IN MEDICARE PATIENT: Primary | ICD-10-CM

## 2021-09-21 DIAGNOSIS — L43.9 LICHEN PLANUS: ICD-10-CM

## 2021-09-21 DIAGNOSIS — Z78.0 POSTMENOPAUSAL: ICD-10-CM

## 2021-09-21 DIAGNOSIS — Z23 NEED FOR IMMUNIZATION AGAINST INFLUENZA: ICD-10-CM

## 2021-09-21 DIAGNOSIS — Z12.31 ENCOUNTER FOR SCREENING MAMMOGRAM FOR MALIGNANT NEOPLASM OF BREAST: ICD-10-CM

## 2021-09-21 DIAGNOSIS — Z12.11 ENCOUNTER FOR SCREENING FOR COLORECTAL MALIGNANT NEOPLASM: ICD-10-CM

## 2021-09-21 DIAGNOSIS — E55.9 VITAMIN D DEFICIENCY: ICD-10-CM

## 2021-09-21 DIAGNOSIS — I10 ESSENTIAL HYPERTENSION: ICD-10-CM

## 2021-09-21 DIAGNOSIS — K58.0 IRRITABLE BOWEL SYNDROME WITH DIARRHEA: ICD-10-CM

## 2021-09-21 DIAGNOSIS — Z12.12 ENCOUNTER FOR SCREENING FOR COLORECTAL MALIGNANT NEOPLASM: ICD-10-CM

## 2021-09-21 DIAGNOSIS — E78.5 HYPERLIPIDEMIA, UNSPECIFIED HYPERLIPIDEMIA TYPE: ICD-10-CM

## 2021-09-21 DIAGNOSIS — M17.11 PRIMARY OSTEOARTHRITIS OF RIGHT KNEE: ICD-10-CM

## 2021-09-21 DIAGNOSIS — R73.01 IMPAIRED FASTING GLUCOSE: ICD-10-CM

## 2021-09-21 DIAGNOSIS — E03.9 ACQUIRED HYPOTHYROIDISM: ICD-10-CM

## 2021-09-21 PROCEDURE — G0439 PPPS, SUBSEQ VISIT: HCPCS | Performed by: FAMILY MEDICINE

## 2021-09-21 PROCEDURE — 90686 IIV4 VACC NO PRSV 0.5 ML IM: CPT | Performed by: FAMILY MEDICINE

## 2021-09-21 PROCEDURE — 1170F FXNL STATUS ASSESSED: CPT | Performed by: FAMILY MEDICINE

## 2021-09-21 PROCEDURE — 20610 DRAIN/INJ JOINT/BURSA W/O US: CPT | Performed by: FAMILY MEDICINE

## 2021-09-21 PROCEDURE — 1159F MED LIST DOCD IN RCRD: CPT | Performed by: FAMILY MEDICINE

## 2021-09-21 PROCEDURE — G0008 ADMIN INFLUENZA VIRUS VAC: HCPCS | Performed by: FAMILY MEDICINE

## 2021-09-21 PROCEDURE — 96160 PT-FOCUSED HLTH RISK ASSMT: CPT | Performed by: FAMILY MEDICINE

## 2021-09-21 RX ORDER — MELATONIN
1000 DAILY
COMMUNITY
End: 2021-11-08 | Stop reason: SDUPTHER

## 2021-09-21 RX ORDER — TRIAMCINOLONE ACETONIDE 40 MG/ML
80 INJECTION, SUSPENSION INTRA-ARTICULAR; INTRAMUSCULAR ONCE
Status: COMPLETED | OUTPATIENT
Start: 2021-09-21 | End: 2021-09-21

## 2021-09-21 RX ORDER — CLOBETASOL PROPIONATE 0.5 MG/G
CREAM TOPICAL
Qty: 60 G | Refills: 5 | Status: SHIPPED | OUTPATIENT
Start: 2021-09-21 | End: 2022-11-30

## 2021-09-21 RX ADMIN — TRIAMCINOLONE ACETONIDE 80 MG: 40 INJECTION, SUSPENSION INTRA-ARTICULAR; INTRAMUSCULAR at 09:37

## 2021-09-21 NOTE — PROGRESS NOTES
Date of Encounter: 2021  Patient: Jory Crowley,  1945    SUBSEQUENT MEDICARE WELLNESS VISIT  Subjective   History of Presenting Illness  Chief complaint: Subsequent Medicare wellness    Right knee osteoarthritis, previously diagnosed by orthopedics, improved with corticosteroid injections.  She does not had 1 in several years and interested in once a day.  Aching after long walks.    Hypertension: Has not relapsed after being off of all medications.     Hyperlipidemia: History of severe LFT elevations on statins, recent AST/ALT unremarkable, not interested in medication at this time.     Hypothyroidism: Some recent fatigue and hair changes.     Osteoporosis: Has been on alendronate for over 15 years without side effects.   We stopped this medication at her last visit.  Last DEXA .     History of impaired fasting glucose.     Hearing loss not interested in hearing aids due to pandemic     Lives with .  Prior 26-pack-year smoker quit at age 73.  Rare alcohol use.  Exercises daily by walking her dog.  Tries to avoid red meat in her diet, trying to try meatless meals.  Due for breast, lung cancer, colon cancer, and bone density screening Retired teacher.  Does not have a living will.  Adverse reaction with high-dose influenza vaccine, due for normal dose influenza vaccine.  Also discussed shingles vaccine.    Pain  In the past 7 days, how much pain have you felt? 2/10    Review of Systems:  Negative for fever, congestion, chest pain upon exertion, shortness of breath, vision changes, vomiting, dysuria, lymphadenopathy, muscle weakness, numbness, mood changes, rashes.    Health Risk Assessment:    Recent Hospitalizations:  No hospitalization(s) within the last year.    Current Medical Providers:  Patient Care Team:  Gerald Mosquera MD as PCP - General (Family Medicine)    Smoking Status:  Social History     Tobacco Use   Smoking Status Former Smoker   • Packs/day: 0.25   • Types: Cigarettes    Smokeless Tobacco Former User       Alcohol Consumption:  Social History     Substance and Sexual Activity   Alcohol Use Yes    Comment: occ       Depression Screen:   PHQ-2/PHQ-9 Depression Screening 9/21/2021   Little interest or pleasure in doing things 0   Feeling down, depressed, or hopeless 0   Trouble falling or staying asleep, or sleeping too much -   Feeling tired or having little energy -   Poor appetite or overeating -   Feeling bad about yourself - or that you are a failure or have let yourself or your family down -   Trouble concentrating on things, such as reading the newspaper or watching television -   Moving or speaking so slowly that other people could have noticed. Or the opposite - being so fidgety or restless that you have been moving around a lot more than usual -   Thoughts that you would be better off dead, or of hurting yourself in some way -   Total Score 0   If you checked off any problems, how difficult have these problems made it for you to do your work, take care of things at home, or get along with other people? -     I spent more than 16 minutes asking patient questions, counseling and documenting in the chart    Fall Risk Screen:  STEADI Fall Risk Assessment was completed, and patient is at HIGH risk for falls. Assessment completed on:9/21/2021    Health Habits and Functional and Cognitive Screening:  Functional & Cognitive Status 9/21/2021   Do you have difficulty preparing food and eating? No   Do you have difficulty bathing yourself, getting dressed or grooming yourself? No   Do you have difficulty using the toilet? No   Do you have difficulty moving around from place to place? No   Do you have trouble with steps or getting out of a bed or a chair? No   Current Diet -   Dental Exam -   Eye Exam -   Exercise (times per week) -   Current Exercise Activities Include -   Do you need help using the phone?  No   Are you deaf or do you have serious difficulty hearing?  No   Do you need  help with transportation? No   Do you need help shopping? No   Do you need help preparing meals?  No   Do you need help with housework?  No   Do you need help with laundry? No   Do you need help taking your medications? No   Do you need help managing money? No   Do you ever drive or ride in a car without wearing a seat belt? No   Have you felt unusual stress, anger or loneliness in the last month? -   Who do you live with? -   If you need help, do you have trouble finding someone available to you? -   Have you been bothered in the last four weeks by sexual problems? -   Do you have difficulty concentrating, remembering or making decisions? No       Does the patient have evidence of cognitive impairment? No    Aspirin use counseling:Does not need ASA (and currently is not on it)    Recent Lab Results:        Age-appropriate Screening Schedule:  Refer to the list below for future screening recommendations based on patient's age, sex and/or medical conditions. Orders for these recommended tests are listed in the plan section. The patient has been provided with a written plan.    Health Maintenance   Topic Date Due   • ZOSTER VACCINE (1 of 2) Never done   • MAMMOGRAM  07/31/2021   • LIPID PANEL  09/17/2021   • TDAP/TD VACCINES (2 - Td or Tdap) 03/27/2023   • DXA SCAN  Discontinued       Compared to one year ago, the patient feels her physical health is the same.  Compared to one year ago, the patient feels her mental health is the same.    Reviewed chart for potential of high risk medication in the elderly: yes  Reviewed chart for potential of harmful drug interactions in the elderly:yes    Advanced Care Planning:  Advance Care Planning   ACP discussion was held with the patient during this visit. Patient does not have an advance directive, information provided.    The following portions of the patient's history were reviewed and updated as appropriate: allergies, current medications, past family history, past medical  history, past social history, past surgical history and problem list.    Patient Active Problem List   Diagnosis   • Atopic rhinitis   • Hyperlipidemia   • Hypothyroidism   • Impaired fasting glucose   • Osteoporosis   • Vitamin D deficiency   • Irritable bowel syndrome with diarrhea   • Hearing loss   • Primary osteoarthritis of right knee     Past Medical History:   Diagnosis Date   • Breast cyst    • Hearing loss 9/17/2020   • HTN (hypertension) 7/11/2016   • Hyperlipidemia    • Hypertension    • Hypothyroidism    • Irritable bowel syndrome with diarrhea 6/22/2018   • Osteopenia    • Osteoporosis    • Primary osteoarthritis of right knee 9/21/2021     Past Surgical History:   Procedure Laterality Date   • BREAST CYST EXCISION     • CHOLECYSTECTOMY     • HYSTERECTOMY     • TONSILLECTOMY       Family History   Problem Relation Age of Onset   • Heart attack Mother    • Heart disease Mother    • Thyroid disease Mother    • Heart disease Father    • Breast cancer Maternal Grandmother    • Leukemia Brother    • Thyroid disease Brother        Current Outpatient Medications:   •  Cholecalciferol (Vitamin D) 25 MCG (1000 UT) tablet, Take 1 tab by mouth daily for low vitamin D levels, Disp: 90 tablet, Rfl: 3  •  cholecalciferol (VITAMIN D3) 25 MCG (1000 UT) tablet, Take 1,000 Units by mouth Daily., Disp: , Rfl:   •  clobetasol (TEMOVATE) 0.05 % cream, Apply topically to appropriate area as directed daily, Disp: 60 g, Rfl: 5  •  Synthroid 150 MCG tablet, TAKE 1 TABLET BY MOUTH DAILY, Disp: 90 tablet, Rfl: 3  No current facility-administered medications for this visit.  Allergies   Allergen Reactions   • Codeine Unknown - Low Severity   • Influenza Vaccines Rash and Other (See Comments)     HIGH DOSE FLU SHOT - regular dose is fine   • Nabumetone Diarrhea   • Propoxyphene Unknown - Low Severity     darvon     Social History     Tobacco Use   • Smoking status: Former Smoker     Packs/day: 0.25     Types: Cigarettes   •  Smokeless tobacco: Former User   Substance Use Topics   • Alcohol use: Yes     Comment: occ   • Drug use: Never          Objective   Physical Exam  Vitals:    09/21/21 0914   BP: 122/86   Pulse: 77   Temp: 97.3 °F (36.3 °C)   TempSrc: Temporal   SpO2: 97%   Weight: 74.5 kg (164 lb 3.2 oz)     Body mass index is 29.1 kg/m².  Discussed the patient's BMI with her. The BMI is in the acceptable range.    Constitutional: NAD.  Eyes: EOMI. PERRLA. Normal conjunctiva.  Ear, nose, mouth, throat: No tonsillar exudates or erythema.   Normal nasal mucosa. Normal external ear canals and TMs bilaterally.  Cardiovascular: RRR. No murmurs. No LE edema b/l. Radial pulses 2+ bilaterally.  Pulmonary: CTA b/l. Good effort.  Integumentary: No lacerations or wounds on face or upper extremities.  Lymphatic: No anterior cervical lymphadenopathy.  Endocrine: No thyromegaly or palpable thyroid nodules.  Psychiatric: Normal affect. Normal thought content.  Musculoskeletal: Right knee with gross arthritic changes, tenderness to palpation of the medial joint line.  Crepitus with active and passive range of motion.     Assessment/Plan   Assessment and Plan  Pleasant 75-year-old female with hypothyroidism, hyperlipidemia, osteoporosis status post alendronate for 15 years, history of hypertension, vitamin D deficiency, IBS, knee osteoarthritis, among other problems, who presents with the following:    Advance Directive Discussion  Breast Cancer/Mammogram Screening  Hearing Problem  Immunizations Discussed/Encouraged (specific immunizations; Influenza and Shingrix )  Lung Cancer Risk  Osteoporosis Risk    The above risks/problems have been discussed with the patient.  Pertinent information has been shared with the patient in the After Visit Summary.  Other plans as below:    Diagnoses and all orders for this visit:    1. Encounter for subsequent annual wellness visit (AWV) in Medicare patient (Primary)    2. Essential hypertension: Resolved  -      CBC & Differential    3. Hyperlipidemia, unspecified hyperlipidemia type  -     Lipid Panel  -     Thyroid Panel With TSH  -     Comprehensive Metabolic Panel    4. Acquired hypothyroidism    5. Impaired fasting glucose    6. Vitamin D deficiency  -     Vitamin D 25 Hydroxy    7. Irritable bowel syndrome with diarrhea: Stable    8. Primary osteoarthritis of right knee  -     triamcinolone acetonide (KENALOG-40) injection 80 mg    Corticosteroid injection procedure note:  We discussed risks and benefits of a knee joint corticosteroid injection. Patient consented verbally. Skin was prepped with an alcohol swab and ethyl chloride spray for topical anesthesia. Using the lateral approach a 25-gauge needle was used to inject 2 mL of triamcinolone (40mg/mL) with 1 mL of 1% lidocaine without epinephrine. Bandage applied. Patient tolerated procedure well and there were no complications.    9. Osteoporosis, unspecified osteoporosis type, unspecified pathological fracture presence: Candidate for Prolia, will recheck DEXA    10. Lichen planus: Controlled  -     clobetasol (TEMOVATE) 0.05 % cream; Apply topically to appropriate area as directed daily  Dispense: 60 g; Refill: 5    11. Encounter for screening mammogram for malignant neoplasm of breast  -     Mammo Screening Digital Tomosynthesis Bilateral With CAD; Future    12. Postmenopausal  -     DEXA Bone Density Axial    13. Encounter for screening for colorectal malignant neoplasm  -     Ambulatory Referral For Screening Colonoscopy    14. Need for immunization against influenza: Did not tolerate high-dose flu  -     FluLaval >6 Months ([id number])    Follow Up:  Return to office in 1 year for annual physical or earlier as needed.    An After Visit Summary and PPPS were given to the patient.      Gerald Mosquera MD  Family Medicine  O: [phone]  C: [phone]    Disclaimer: Parts of this note were dictated by speech recognition. Minor errors in transcription may be  present. Please call if questions.

## 2021-09-21 NOTE — PROGRESS NOTES
Venipuncture performed in [facility] by KD with good hemostasis. Patient tolerated well. 9/21/2021 Shavon MARSHALL LPN.

## 2021-09-22 LAB
25(OH)D3+25(OH)D2 SERPL-MCNC: 23.8 NG/ML (ref 30–100)
ALBUMIN SERPL-MCNC: 4.7 G/DL (ref 3.7–4.7)
ALBUMIN/GLOB SERPL: 2.2 {RATIO} (ref 1.2–2.2)
ALP SERPL-CCNC: 111 IU/L (ref 44–121)
ALT SERPL-CCNC: 28 IU/L (ref 0–32)
AST SERPL-CCNC: 19 IU/L (ref 0–40)
BASOPHILS # BLD AUTO: 0.1 X10E3/UL (ref 0–0.2)
BASOPHILS NFR BLD AUTO: 1 %
BILIRUB SERPL-MCNC: 0.3 MG/DL (ref 0–1.2)
BUN SERPL-MCNC: 14 MG/DL (ref 8–27)
BUN/CREAT SERPL: 22 (ref 12–28)
CALCIUM SERPL-MCNC: 9.6 MG/DL (ref 8.7–10.3)
CHLORIDE SERPL-SCNC: 103 MMOL/L (ref 96–106)
CHOLEST SERPL-MCNC: 223 MG/DL (ref 100–199)
CO2 SERPL-SCNC: 23 MMOL/L (ref 20–29)
CREAT SERPL-MCNC: 0.64 MG/DL (ref 0.57–1)
EOSINOPHIL # BLD AUTO: 0.1 X10E3/UL (ref 0–0.4)
EOSINOPHIL NFR BLD AUTO: 2 %
ERYTHROCYTE [DISTWIDTH] IN BLOOD BY AUTOMATED COUNT: 12.2 % (ref 11.7–15.4)
FT4I SERPL CALC-MCNC: 2.6 (ref 1.2–4.9)
GLOBULIN SER CALC-MCNC: 2.1 G/DL (ref 1.5–4.5)
GLUCOSE SERPL-MCNC: 97 MG/DL (ref 65–99)
HCT VFR BLD AUTO: 42.9 % (ref 34–46.6)
HDLC SERPL-MCNC: 66 MG/DL
HGB BLD-MCNC: 14.6 G/DL (ref 11.1–15.9)
IMM GRANULOCYTES # BLD AUTO: 0 X10E3/UL (ref 0–0.1)
IMM GRANULOCYTES NFR BLD AUTO: 0 %
LDLC SERPL CALC-MCNC: 136 MG/DL (ref 0–99)
LYMPHOCYTES # BLD AUTO: 1.8 X10E3/UL (ref 0.7–3.1)
LYMPHOCYTES NFR BLD AUTO: 22 %
MCH RBC QN AUTO: 33.3 PG (ref 26.6–33)
MCHC RBC AUTO-ENTMCNC: 34 G/DL (ref 31.5–35.7)
MCV RBC AUTO: 98 FL (ref 79–97)
MONOCYTES # BLD AUTO: 0.6 X10E3/UL (ref 0.1–0.9)
MONOCYTES NFR BLD AUTO: 8 %
NEUTROPHILS # BLD AUTO: 5.5 X10E3/UL (ref 1.4–7)
NEUTROPHILS NFR BLD AUTO: 67 %
PLATELET # BLD AUTO: 344 X10E3/UL (ref 150–450)
POTASSIUM SERPL-SCNC: 4.8 MMOL/L (ref 3.5–5.2)
PROT SERPL-MCNC: 6.8 G/DL (ref 6–8.5)
RBC # BLD AUTO: 4.38 X10E6/UL (ref 3.77–5.28)
SODIUM SERPL-SCNC: 142 MMOL/L (ref 134–144)
T3RU NFR SERPL: 30 % (ref 24–39)
T4 SERPL-MCNC: 8.8 UG/DL (ref 4.5–12)
TRIGL SERPL-MCNC: 121 MG/DL (ref 0–149)
TSH SERPL DL<=0.005 MIU/L-ACNC: 3.58 UIU/ML (ref 0.45–4.5)
VLDLC SERPL CALC-MCNC: 21 MG/DL (ref 5–40)
WBC # BLD AUTO: 8 X10E3/UL (ref 3.4–10.8)

## 2021-10-13 ENCOUNTER — TELEPHONE (OUTPATIENT)
Dept: GASTROENTEROLOGY | Facility: CLINIC | Age: 76
End: 2021-10-13

## 2021-10-19 ENCOUNTER — PREP FOR SURGERY (OUTPATIENT)
Dept: SURGERY | Facility: SURGERY CENTER | Age: 76
End: 2021-10-19

## 2021-10-19 DIAGNOSIS — Z83.71 FAMILY HISTORY OF COLONIC POLYPS: Primary | ICD-10-CM

## 2021-10-21 PROBLEM — Z83.719 FAMILY HISTORY OF COLONIC POLYPS: Status: ACTIVE | Noted: 2021-10-21

## 2021-10-21 PROBLEM — Z83.71 FAMILY HISTORY OF COLONIC POLYPS: Status: ACTIVE | Noted: 2021-10-21

## 2021-10-21 NOTE — TELEPHONE ENCOUNTER
FAST TRACK - REFERRAL - LAST COLONOSCOPY 1999 - SCREENING - FAMILY HISTORY POLYPS, BROTHER - [facility]EDU[place] [facility].  
Spoke with patient.  Scheduled at [place] on 02/01/2022 at 12:30pm - arrive 11:30am.  Will mail instructions.  
negative - no change in level of consciousness

## 2021-11-08 ENCOUNTER — TELEPHONE (OUTPATIENT)
Dept: INTERNAL MEDICINE | Facility: CLINIC | Age: 76
End: 2021-11-08

## 2021-11-08 DIAGNOSIS — E55.9 VITAMIN D DEFICIENCY: Primary | ICD-10-CM

## 2021-11-08 RX ORDER — MELATONIN
1000 DAILY
Qty: 90 TABLET | Refills: 1 | Status: SHIPPED | OUTPATIENT
Start: 2021-11-08 | End: 2021-11-08 | Stop reason: SDUPTHER

## 2021-11-08 RX ORDER — MELATONIN
1000 DAILY
Qty: 90 TABLET | Refills: 11 | Status: SHIPPED | OUTPATIENT
Start: 2021-11-08 | End: 2022-10-27

## 2021-11-08 NOTE — TELEPHONE ENCOUNTER
Caller: Jory Crowley    Relationship: Self          Requested Prescriptions:       cholecalciferol (VITAMIN D3) 25 MCG (1000 UT) tablet         Pharmacy where request should be sent: *  [id number] - [address] AT [place] [address] - [phone]  - [phone]   [phone]    Best call back number:[phone]     Does the patient have less than a 3 day supply:  [x] Yes  [] No    Sahara Paula, GuySched Rep   11/08/21 08:59 EST     PLEASE ADVISE.

## 2022-02-01 ENCOUNTER — HOSPITAL ENCOUNTER (OUTPATIENT)
Facility: SURGERY CENTER | Age: 77
Setting detail: HOSPITAL OUTPATIENT SURGERY
Discharge: HOME | End: 2022-02-01
Attending: INTERNAL MEDICINE | Admitting: INTERNAL MEDICINE

## 2022-02-01 ENCOUNTER — ANESTHESIA (OUTPATIENT)
Dept: SURGERY | Facility: SURGERY CENTER | Age: 77
End: 2022-02-01

## 2022-02-01 ENCOUNTER — ANESTHESIA EVENT (OUTPATIENT)
Dept: SURGERY | Facility: SURGERY CENTER | Age: 77
End: 2022-02-01

## 2022-02-01 VITALS
OXYGEN SATURATION: 96 % | TEMPERATURE: 97.5 F | SYSTOLIC BLOOD PRESSURE: 140 MMHG | HEIGHT: 63 IN | BODY MASS INDEX: 27.32 KG/M2 | WEIGHT: 154.2 LBS | HEART RATE: 89 BPM | RESPIRATION RATE: 18 BRPM | DIASTOLIC BLOOD PRESSURE: 76 MMHG

## 2022-02-01 DIAGNOSIS — Z83.71 FAMILY HISTORY OF COLONIC POLYPS: ICD-10-CM

## 2022-02-01 PROCEDURE — 0DBL8ZZ EXCISION OF TRANSVERSE COLON, VIA NATURAL OR ARTIFICIAL OPENING ENDOSCOPIC: ICD-10-PCS | Performed by: INTERNAL MEDICINE

## 2022-02-01 PROCEDURE — 45380 COLONOSCOPY AND BIOPSY: CPT | Performed by: INTERNAL MEDICINE

## 2022-02-01 PROCEDURE — 45385 COLONOSCOPY W/LESION REMOVAL: CPT | Performed by: INTERNAL MEDICINE

## 2022-02-01 PROCEDURE — 0DBM8ZZ EXCISION OF DESCENDING COLON, VIA NATURAL OR ARTIFICIAL OPENING ENDOSCOPIC: ICD-10-PCS | Performed by: INTERNAL MEDICINE

## 2022-02-01 PROCEDURE — 0DBK8ZZ EXCISION OF ASCENDING COLON, VIA NATURAL OR ARTIFICIAL OPENING ENDOSCOPIC: ICD-10-PCS | Performed by: INTERNAL MEDICINE

## 2022-02-01 PROCEDURE — 0DBN8ZZ EXCISION OF SIGMOID COLON, VIA NATURAL OR ARTIFICIAL OPENING ENDOSCOPIC: ICD-10-PCS | Performed by: INTERNAL MEDICINE

## 2022-02-01 PROCEDURE — 88305 TISSUE EXAM BY PATHOLOGIST: CPT | Performed by: INTERNAL MEDICINE

## 2022-02-01 PROCEDURE — 0DBP8ZZ EXCISION OF RECTUM, VIA NATURAL OR ARTIFICIAL OPENING ENDOSCOPIC: ICD-10-PCS | Performed by: INTERNAL MEDICINE

## 2022-02-01 PROCEDURE — 25010000002 PROPOFOL 10 MG/ML EMULSION: Performed by: ANESTHESIOLOGY

## 2022-02-01 RX ORDER — LIDOCAINE HYDROCHLORIDE 10 MG/ML
0.5 INJECTION, SOLUTION INFILTRATION; PERINEURAL ONCE AS NEEDED
Status: DISCONTINUED | OUTPATIENT
Start: 2022-02-01 | End: 2022-02-01 | Stop reason: HOSPADM

## 2022-02-01 RX ORDER — PROPOFOL 10 MG/ML
VIAL (ML) INTRAVENOUS AS NEEDED
Status: DISCONTINUED | OUTPATIENT
Start: 2022-02-01 | End: 2022-02-01 | Stop reason: SURG

## 2022-02-01 RX ORDER — LIDOCAINE HYDROCHLORIDE 20 MG/ML
INJECTION, SOLUTION INFILTRATION; PERINEURAL AS NEEDED
Status: DISCONTINUED | OUTPATIENT
Start: 2022-02-01 | End: 2022-02-01 | Stop reason: SURG

## 2022-02-01 RX ORDER — SODIUM CHLORIDE, SODIUM LACTATE, POTASSIUM CHLORIDE, CALCIUM CHLORIDE 600; 310; 30; 20 MG/100ML; MG/100ML; MG/100ML; MG/100ML
1000 INJECTION, SOLUTION INTRAVENOUS CONTINUOUS
Status: DISCONTINUED | OUTPATIENT
Start: 2022-02-01 | End: 2022-02-01 | Stop reason: HOSPADM

## 2022-02-01 RX ORDER — SODIUM CHLORIDE 0.9 % (FLUSH) 0.9 %
10 SYRINGE (ML) INJECTION AS NEEDED
Status: DISCONTINUED | OUTPATIENT
Start: 2022-02-01 | End: 2022-02-01 | Stop reason: HOSPADM

## 2022-02-01 RX ADMIN — PROPOFOL 100 MG: 10 INJECTION, EMULSION INTRAVENOUS at 12:32

## 2022-02-01 RX ADMIN — LIDOCAINE HYDROCHLORIDE 100 MG: 20 INJECTION, SOLUTION INFILTRATION; PERINEURAL at 12:32

## 2022-02-01 RX ADMIN — SODIUM CHLORIDE, POTASSIUM CHLORIDE, SODIUM LACTATE AND CALCIUM CHLORIDE 1000 ML: 600; 310; 30; 20 INJECTION, SOLUTION INTRAVENOUS at 12:01

## 2022-02-01 RX ADMIN — PROPOFOL INJECTABLE EMULSION 100 MCG/KG/MIN: 10 INJECTION, EMULSION INTRAVENOUS at 12:32

## 2022-02-01 NOTE — H&P
Patient Care Team:  Gerald Mosquera MD as PCP - General (Family Medicine)    CHIEF COMPLAINT: Screening CRC and Family hx of CRC or polyps    HISTORY OF PRESENT ILLNESS:  No exam for >10 years    Past Medical History:   Diagnosis Date   • Breast cyst    • Hearing loss 9/17/2020   • HTN (hypertension) 7/11/2016   • Hyperlipidemia    • Hypertension    • Hypothyroidism    • Irritable bowel syndrome with diarrhea 6/22/2018   • Osteopenia    • Osteoporosis    • Primary osteoarthritis of right knee 9/21/2021     Past Surgical History:   Procedure Laterality Date   • BREAST CYST EXCISION     • CHOLECYSTECTOMY     • HYSTERECTOMY     • TONSILLECTOMY       Family History   Problem Relation Age of Onset   • Heart attack Mother    • Heart disease Mother    • Thyroid disease Mother    • Heart disease Father    • Breast cancer Maternal Grandmother    • Leukemia Brother    • Thyroid disease Brother    • Malig Hyperthermia Neg Hx      Social History     Tobacco Use   • Smoking status: Former Smoker     Packs/day: 0.25     Types: Cigarettes   • Smokeless tobacco: Former User   Vaping Use   • Vaping Use: Never used   Substance Use Topics   • Alcohol use: Yes     Comment: occ   • Drug use: Never     Medications Prior to Admission   Medication Sig Dispense Refill Last Dose   • cholecalciferol (VITAMIN D3) 25 MCG (1000 UT) tablet Take 1 tablet by mouth Daily. 90 tablet 11 1/29/2022   • clobetasol (TEMOVATE) 0.05 % cream Apply topically to appropriate area as directed daily 60 g 5 1/31/2022 at Unknown time   • Synthroid 150 MCG tablet TAKE 1 TABLET BY MOUTH DAILY 90 tablet 3 2/1/2022 at 0600     Allergies:  Codeine, Influenza vaccines, Nabumetone, and Propoxyphene    REVIEW OF SYSTEMS:  Please see the above history of present illness for pertinent positives and negatives.  The remainder of the patient's systems have been reviewed and are negative.     Vital Signs  Temp:  [97.5 °F (36.4 °C)] 97.5 °F (36.4 °C)  Heart Rate:  [109] 109  Resp:   "[20] 20  BP: (118)/(86) 118/86    Flowsheet Rows      First Filed Value   Admission Height 160 cm (63\") Documented at 01/28/2022 0843   Admission Weight 69.9 kg (154 lb) Documented at 01/28/2022 0843           Physical Exam:  Physical Exam   Constitutional: Patient appears well-developed and well-nourished and in no acute distress   HEENT:   Head: Normocephalic and atraumatic.   Eyes:  Pupils are equal, round, and reactive to light. EOM are intact. Sclerae are anicteric and non-injected.  Mouth and Throat: Patient has moist mucous membranes. Oropharynx is clear of any erythema or exudate.     Neck: Neck supple. No JVD present. No thyromegaly present. No lymphadenopathy present.  Cardiovascular: Regular rate, regular rhythm, S1 normal and S2 normal.  Exam reveals no gallop and no friction rub.  No murmur heard.  Pulmonary/Chest: Lungs are clear to auscultation bilaterally. No respiratory distress. No wheezes. No rhonchi. No rales.   Abdominal: Soft. Bowel sounds are normal. No distension and no mass. There is no hepatosplenomegaly. There is no tenderness.   Musculoskeletal: Normal Muscle tone  Extremities: No edema. Pulses are palpable in all 4 extremities.  Neurological: Patient is alert and oriented to person, place, and time. Cranial nerves II-XII are grossly intact with no focal deficits.  Skin: Skin is warm. No rash noted. Nails show no clubbing.  No cyanosis or erythema.    Debilities/Disabilities Identified: None  Emotional Behavior: Appropriate     Results Review:   I reviewed the patient's new clinical results.    Lab Results (most recent)     None          Imaging Results (Most Recent)     None        reviewed    ECG/EMG Results (most recent)     None        reviewed    Assessment/Plan   Screening CRC and Family hx of CRC or polyps/  colonoscopy      I discussed the patient's findings and my recommendations with patient.     Justin Hester MD  02/01/22  12:10 EST    Time: 10 min prior to " procedure.

## 2022-02-01 NOTE — ANESTHESIA PREPROCEDURE EVALUATION
Anesthesia Evaluation     Patient summary reviewed and Nursing notes reviewed   no history of anesthetic complications:               Airway   Mallampati: III  TM distance: >3 FB  Neck ROM: full  Dental    (+) upper dentures    Pulmonary - normal exam   (+) a smoker Former,   Cardiovascular - normal exam    (+) hypertension,   (-) angina      Neuro/Psych  GI/Hepatic/Renal/Endo    (+)   thyroid problem hypothyroidism    ROS Comment: F/H Colon Polyp    Musculoskeletal     Abdominal    Substance History      OB/GYN          Other                      Anesthesia Plan    ASA 2     MAC       Anesthetic plan, all risks, benefits, and alternatives have been provided, discussed and informed consent has been obtained with: patient.        CODE STATUS:

## 2022-02-01 NOTE — ANESTHESIA POSTPROCEDURE EVALUATION
Patient: Jory Crowley    Procedure Summary     Date: 02/01/22 Room / Location: [facility] OR 05 / [facility]    Anesthesia Start: 1231 Anesthesia Stop: 1329    Procedure: COLONOSCOPY (N/A ) Diagnosis:       Family history of colonic polyps      Colon polyp      (Family history of colonic polyps [Z83.71])    Surgeons: Justin Hester MD Provider: Brice Hummel MD    Anesthesia Type: MAC ASA Status: 2          Anesthesia Type: MAC    Vitals  No vitals data found for the desired time range.          Post Anesthesia Care and Evaluation    Patient location during evaluation: PHASE II  Anesthetic complications: No anesthetic complications

## 2022-02-02 LAB
LAB AP CASE REPORT: NORMAL
LAB AP CLINICAL INFORMATION: NORMAL
PATH REPORT.FINAL DX SPEC: NORMAL
PATH REPORT.GROSS SPEC: NORMAL

## 2022-03-14 ENCOUNTER — HOSPITAL ENCOUNTER (OUTPATIENT)
Dept: BONE DENSITY | Facility: HOSPITAL | Age: 77
Discharge: HOME | End: 2022-03-14

## 2022-03-14 ENCOUNTER — HOSPITAL ENCOUNTER (OUTPATIENT)
Dept: MAMMOGRAPHY | Facility: HOSPITAL | Age: 77
Discharge: HOME | End: 2022-03-14

## 2022-03-14 DIAGNOSIS — Z12.31 ENCOUNTER FOR SCREENING MAMMOGRAM FOR MALIGNANT NEOPLASM OF BREAST: ICD-10-CM

## 2022-03-14 PROCEDURE — 77063 BREAST TOMOSYNTHESIS BI: CPT

## 2022-03-14 PROCEDURE — 77067 SCR MAMMO BI INCL CAD: CPT

## 2022-03-14 PROCEDURE — 77080 DXA BONE DENSITY AXIAL: CPT

## 2022-08-31 DIAGNOSIS — E03.9 ACQUIRED HYPOTHYROIDISM: ICD-10-CM

## 2022-08-31 RX ORDER — LEVOTHYROXINE SODIUM 150 MCG
TABLET ORAL
Qty: 90 TABLET | Refills: 3 | Status: SHIPPED | OUTPATIENT
Start: 2022-08-31

## 2022-10-27 ENCOUNTER — PATIENT MESSAGE (OUTPATIENT)
Dept: INTERNAL MEDICINE | Facility: CLINIC | Age: 77
End: 2022-10-27

## 2022-10-27 ENCOUNTER — OFFICE VISIT (OUTPATIENT)
Dept: INTERNAL MEDICINE | Facility: CLINIC | Age: 77
End: 2022-10-27

## 2022-10-27 VITALS
SYSTOLIC BLOOD PRESSURE: 140 MMHG | TEMPERATURE: 96.8 F | BODY MASS INDEX: 27.11 KG/M2 | HEART RATE: 90 BPM | DIASTOLIC BLOOD PRESSURE: 80 MMHG | OXYGEN SATURATION: 95 % | HEIGHT: 63 IN | WEIGHT: 153 LBS

## 2022-10-27 DIAGNOSIS — M81.0 OSTEOPOROSIS, UNSPECIFIED OSTEOPOROSIS TYPE, UNSPECIFIED PATHOLOGICAL FRACTURE PRESENCE: ICD-10-CM

## 2022-10-27 DIAGNOSIS — E55.9 VITAMIN D DEFICIENCY: ICD-10-CM

## 2022-10-27 DIAGNOSIS — E03.9 ACQUIRED HYPOTHYROIDISM: ICD-10-CM

## 2022-10-27 DIAGNOSIS — Z00.00 ENCOUNTER FOR SUBSEQUENT ANNUAL WELLNESS VISIT (AWV) IN MEDICARE PATIENT: Primary | ICD-10-CM

## 2022-10-27 DIAGNOSIS — R07.89 ATYPICAL CHEST PAIN: ICD-10-CM

## 2022-10-27 DIAGNOSIS — Z13.9 SCREENING FOR UNSPECIFIED CONDITION: ICD-10-CM

## 2022-10-27 DIAGNOSIS — M17.11 PRIMARY OSTEOARTHRITIS OF RIGHT KNEE: ICD-10-CM

## 2022-10-27 DIAGNOSIS — M81.0 AGE-RELATED OSTEOPOROSIS WITHOUT CURRENT PATHOLOGICAL FRACTURE: ICD-10-CM

## 2022-10-27 DIAGNOSIS — Z87.891 PERSONAL HISTORY OF NICOTINE DEPENDENCE: Primary | ICD-10-CM

## 2022-10-27 DIAGNOSIS — E78.5 HYPERLIPIDEMIA, UNSPECIFIED HYPERLIPIDEMIA TYPE: ICD-10-CM

## 2022-10-27 DIAGNOSIS — K58.0 IRRITABLE BOWEL SYNDROME WITH DIARRHEA: ICD-10-CM

## 2022-10-27 PROCEDURE — 1126F AMNT PAIN NOTED NONE PRSNT: CPT | Performed by: FAMILY MEDICINE

## 2022-10-27 PROCEDURE — 93000 ELECTROCARDIOGRAM COMPLETE: CPT | Performed by: FAMILY MEDICINE

## 2022-10-27 PROCEDURE — 1159F MED LIST DOCD IN RCRD: CPT | Performed by: FAMILY MEDICINE

## 2022-10-27 PROCEDURE — 1170F FXNL STATUS ASSESSED: CPT | Performed by: FAMILY MEDICINE

## 2022-10-27 PROCEDURE — 96160 PT-FOCUSED HLTH RISK ASSMT: CPT | Performed by: FAMILY MEDICINE

## 2022-10-27 PROCEDURE — G0439 PPPS, SUBSEQ VISIT: HCPCS | Performed by: FAMILY MEDICINE

## 2022-10-27 RX ORDER — ERGOCALCIFEROL (VITAMIN D2) 10 MCG
1000 TABLET ORAL DAILY
COMMUNITY

## 2022-10-27 NOTE — PROGRESS NOTES
"Date of Encounter: 10/27/2022  Patient: Jory Crowley,  1945    SUBSEQUENT MEDICARE WELLNESS VISIT  Subjective   History of Presenting Illness  Complaint: Subsequent Medicare wellness     Patient has had a handful of episodes of central, nonradiating chest discomfort that she describes as a \"pulling \".  Usually lasts for a few minutes.  Not associated with shortness of breath or exertion.  She does walk over 10,000 steps daily and she has never had an episode of chest discomfort with this exertion.  She has not had an episode in several weeks.  He does not feel the episodes are worsening or increasing in frequency.  He denies cough and shortness of breath.    Hypothyroidism with no current symptoms.    Osteoarthritis of the right knee doing very well and not currently interested in a corticosteroid injection.  Last 1 about 1 year ago.    IBS not currently on any medications.    Hyperlipidemia: History of severe LFT elevations on statins.     Osteoporosis: Patient was on alendronate for over 15 years without side effects.  We gave her a medication holiday and retested her bone density which did show a decrease.  She is interested in Prolia.     History of impaired fasting glucose.      Lives with .  Prior 26-pack-year smoker quit at age 73.  Rare alcohol use.  Exercises daily by walking her dog about 10,000 steps.  Overall healthy diet, tries to minimize red meat.   Breast cancer screening , colon cancer screening  with 3-year interval, DEXA scan , due for lung cancer screening. Retired teacher.  Does not have a living will.  Adverse reaction with high-dose influenza vaccine, due for normal dose influenza vaccine.  Also discussed shingles vaccine.     Pain  In the past 7 days, how much pain have you felt? 0/10    Review of Systems:  Negative for fever, congestion, chest pain upon exertion, shortness of breath, vision changes, vomiting, dysuria, lymphadenopathy, muscle weakness, numbness, " mood changes, rashes.    Health Risk Assessment:    Recent Hospitalizations:  No hospitalization(s) within the last year.    Current Medical Providers:  Patient Care Team:  Gerald Mosquera MD as PCP - General (Family Medicine)    Smoking Status:  Social History     Tobacco Use   Smoking Status Former   • Packs/day: 0.25   • Types: Cigarettes   Smokeless Tobacco Former       Alcohol Consumption:  Social History     Substance and Sexual Activity   Alcohol Use Yes    Comment: occ       Depression Screen:   PHQ-2/PHQ-9 Depression Screening 9/21/2021   Retired PHQ-9 Total Score 0   Retired Total Score 0     I spent more than 16 minutes asking patient questions, counseling and documenting in the chart    Fall Risk Screen:  LEBRON Fall Risk Assessment was completed, and patient is at MODERATE risk for falls. Assessment completed on:10/27/2022    Health Habits and Functional and Cognitive Screening:  Functional & Cognitive Status 10/27/2022   Do you have difficulty preparing food and eating? No   Do you have difficulty bathing yourself, getting dressed or grooming yourself? No   Do you have difficulty using the toilet? No   Do you have difficulty moving around from place to place? No   Do you have trouble with steps or getting out of a bed or a chair? No   Current Diet Well Balanced Diet   Dental Exam Up to date   Eye Exam Up to date   Exercise (times per week) 7 times per week   Current Exercises Include Walking   Current Exercise Activities Include -   Do you need help using the phone?  No   Are you deaf or do you have serious difficulty hearing?  No   Do you need help with transportation? No   Do you need help shopping? No   Do you need help preparing meals?  No   Do you need help with housework?  No   Do you need help with laundry? No   Do you need help taking your medications? No   Do you need help managing money? No   Do you ever drive or ride in a car without wearing a seat belt? No   Have you felt unusual stress,  anger or loneliness in the last month? -   Who do you live with? -   If you need help, do you have trouble finding someone available to you? -   Have you been bothered in the last four weeks by sexual problems? -   Do you have difficulty concentrating, remembering or making decisions? -       Does the patient have evidence of cognitive impairment? No    Aspirin use counseling:Does not need ASA (and currently is not on it)    Recent Lab Results:        Age-appropriate Screening Schedule:  Refer to the list below for future screening recommendations based on patient's age, sex and/or medical conditions. Orders for these recommended tests are listed in the plan section. The patient has been provided with a written plan.    Health Maintenance   Topic Date Due   • ZOSTER VACCINE (1 of 2) Never done   • LIPID PANEL  09/21/2022   • TDAP/TD VACCINES (2 - Td or Tdap) 03/27/2023   • MAMMOGRAM  03/14/2024   • DXA SCAN  Discontinued       Compared to one year ago, the patient feels her physical health is the same.  Compared to one year ago, the patient feels her mental health is the same.    Reviewed chart for potential of high risk medication in the elderly: yes  Reviewed chart for potential of harmful drug interactions in the elderly:yes    Advanced Care Planning:  Advance Care Planning   ACP discussion was held with the patient during this visit. Patient does not have an advance directive, information provided.    The following portions of the patient's history were reviewed and updated as appropriate: allergies, current medications, past family history, past medical history, past social history, past surgical history and problem list.    Patient Active Problem List   Diagnosis   • Atopic rhinitis   • Hyperlipidemia   • Hypothyroidism   • Impaired fasting glucose   • Osteoporosis   • Vitamin D deficiency   • Irritable bowel syndrome with diarrhea   • Hearing loss   • Primary osteoarthritis of right knee   • Family history of  colonic polyps     Past Medical History:   Diagnosis Date   • Breast cyst    • Hearing loss 9/17/2020   • HTN (hypertension) 7/11/2016   • Hyperlipidemia    • Hypertension    • Hypothyroidism    • Irritable bowel syndrome with diarrhea 6/22/2018   • Osteopenia    • Osteoporosis    • Primary osteoarthritis of right knee 9/21/2021     Past Surgical History:   Procedure Laterality Date   • BREAST CYST EXCISION     • CHOLECYSTECTOMY     • COLONOSCOPY N/A 2/1/2022    Procedure: COLONOSCOPY;  Surgeon: Justin Hester MD;  Location: [facility] MAIN OR;  Service: Gastroenterology;  Laterality: N/A;  Polyps   • HYSTERECTOMY     • TONSILLECTOMY       Family History   Problem Relation Age of Onset   • Heart attack Mother    • Heart disease Mother    • Thyroid disease Mother    • Heart disease Father    • Breast cancer Maternal Grandmother    • Leukemia Brother    • Thyroid disease Brother    • Malig Hyperthermia Neg Hx        Current Outpatient Medications:   •  clobetasol (TEMOVATE) 0.05 % cream, Apply topically to appropriate area as directed daily, Disp: 60 g, Rfl: 5  •  Synthroid 150 MCG tablet, TAKE ONE TABLET BY MOUTH DAILY, Disp: 90 tablet, Rfl: 3  •  Vitamin D, Cholecalciferol, (CHOLECALCIFEROL) 10 MCG (400 UNIT) tablet, Take 2.5 tablets by mouth Daily., Disp: , Rfl:   Allergies   Allergen Reactions   • Codeine Unknown - Low Severity   • Influenza Vaccines Rash and Other (See Comments)     HIGH DOSE FLU SHOT - regular dose is fine   • Nabumetone Diarrhea   • Propoxyphene Unknown - Low Severity     darvon     Social History     Tobacco Use   • Smoking status: Former     Packs/day: 0.25     Types: Cigarettes   • Smokeless tobacco: Former   Vaping Use   • Vaping Use: Never used   Substance Use Topics   • Alcohol use: Yes     Comment: occ   • Drug use: Never          Objective   Physical Exam  Vitals:    10/27/22 1323   BP: 140/80   BP Location: Left arm   Patient Position: Sitting   Cuff Size: Large Adult   Pulse:  "90   Temp: 96.8 °F (36 °C)   SpO2: 95%   Weight: 69.4 kg (153 lb)   Height: 160 cm (63\")     Body mass index is 27.1 kg/m².  Discussed the patient's BMI with her. The BMI is in the acceptable range.    Constitutional: NAD.  Eyes: EOMI. PERRLA. Normal conjunctiva.  Ear, nose, mouth, throat: No tonsillar exudates or erythema.   Normal nasal mucosa. Normal external ear canals and TMs bilaterally.  Cardiovascular: RRR. No murmurs. No LE edema b/l. Radial pulses 2+ bilaterally.  Pulmonary: CTA b/l. Good effort.  Integumentary: No lacerations or wounds on face or upper extremities.  Lymphatic: No anterior cervical lymphadenopathy.  Endocrine: No thyromegaly or palpable thyroid nodules.  Psychiatric: Normal affect. Normal thought content.  Gastrointestinal: Nondistended. No hepatosplenomegaly. No focal tenderness to palpation. Normal bowel sounds.     Assessment & Plan   Assessment and Plan  Pleasant 77-year-old female with hypothyroidism, hyperlipidemia, osteoporosis status post alendronate for 15 years with recent medication holiday, history of hypertension, vitamin D deficiency, IBS, knee osteoarthritis, among other problems, who presents with the following:      Fall Risk  Immunizations Discussed/Encouraged (specific immunizations; Td and Shingrix )  Lung Cancer Risk  Osteoporosis Risk    The above risks/problems have been discussed with the patient.  Pertinent information has been shared with the patient in the After Visit Summary.  Other plans as below:    Diagnoses and all orders for this visit:    1. Encounter for subsequent annual wellness visit (AWV) in Medicare patient (Primary): We discussed preventative care including age and patient-appropriate immunizations and cancer screening. We also discussed the importance of exercise and a healthy diet. This is their annual preventative exam.    2. Atypical chest pain: No typical features.  Tolerates walking very well.  No recent episodes.  EKG unremarkable.  " Cardiopulmonary exam unremarkable.  At this point I recommend continue watchful waiting and if this becomes a recurrent issue need to consider further cardiovascular testing.  -     ECG 12 Lead      ECG 12 Lead    Date/Time: 10/27/2022 4:53 PM  Performed by: Gerald Mosquera MD  Authorized by: Gerald Mosquera MD   Comparison: not compared with previous ECG   Previous ECG: no previous ECG available  Rhythm: sinus rhythm  Rate: normal  Conduction: conduction normal  QRS axis: normal  Other: no other findings    Clinical impression: normal ECG        3. Hyperlipidemia, unspecified hyperlipidemia type: History of severe LFT elevations with statins may consider Zetia pending lipids  -     Comprehensive Metabolic Panel  -     CBC & Differential  -     Lipid Panel  -     Thyroid Panel With TSH    4. Acquired hypothyroidism: Controlled with replacement    5. Primary osteoarthritis of right knee: Stable, corticosteroid ejections as needed    6. Osteoporosis, unspecified osteoporosis type, unspecified pathological fracture presence: We will arrange Prolia, reviewed risks and benefits    7. Irritable bowel syndrome with diarrhea: Stable    8. Vitamin D deficiency  -     Vitamin D,25-Hydroxy    9. Screening for unspecified condition  -     Hemoglobin A1c  -     Urinalysis With Microscopic - Urine, Clean Catch    Follow Up:  Return to office in 1 year for annual physical or earlier as needed.    An After Visit Summary and PPPS were given to the patient.      Gerald Mosquera MD  Family Medicine  O: [phone]    Disclaimer: Parts of this note were dictated by speech recognition. Minor errors in transcription may be present. Please call if questions.

## 2022-10-27 NOTE — TELEPHONE ENCOUNTER
From: Gerald Mosquera MD  To: Jory Crowley  Sent: 10/27/2022 4:49 PM EDT  Subject: Lung cancer screening    He came to my attention after reviewing your chart that you are eligible for lung cancer screening    Back when we first establish care the guidelines were different and you did not qualify for screening but now you do    The current recommendations are to consider lung cancer screening in someone who is smoked about a pack a day for over 20 years, if they are between the ages of 50 and 80, and are within 15 years of quitting smoking. You meet this criteria.    Lung cancer screening which is covered by Medicare consists of a CT scan of your chest to look for lung cancer and lung precancers. This is usually done once a year for several years until you are around age 80.    I do recommend you consider this and would like to order this for you if you are okay with it. Please let me know by call or [employer] message if I can go ahead and schedule

## 2022-10-28 LAB
25(OH)D3+25(OH)D2 SERPL-MCNC: 38.6 NG/ML (ref 30–100)
ALBUMIN SERPL-MCNC: 4.3 G/DL (ref 3.7–4.7)
ALBUMIN/GLOB SERPL: 2 {RATIO} (ref 1.2–2.2)
ALP SERPL-CCNC: 104 IU/L (ref 44–121)
ALT SERPL-CCNC: 20 IU/L (ref 0–32)
APPEARANCE UR: CLEAR
AST SERPL-CCNC: 22 IU/L (ref 0–40)
BACTERIA #/AREA URNS HPF: ABNORMAL /[HPF]
BASOPHILS # BLD AUTO: 0.1 X10E3/UL (ref 0–0.2)
BASOPHILS NFR BLD AUTO: 1 %
BILIRUB SERPL-MCNC: <0.2 MG/DL (ref 0–1.2)
BILIRUB UR QL STRIP: NEGATIVE
BUN SERPL-MCNC: 15 MG/DL (ref 8–27)
BUN/CREAT SERPL: 24 (ref 12–28)
CALCIUM SERPL-MCNC: 9.9 MG/DL (ref 8.7–10.3)
CASTS URNS QL MICRO: ABNORMAL /LPF
CHLORIDE SERPL-SCNC: 102 MMOL/L (ref 96–106)
CHOLEST SERPL-MCNC: 206 MG/DL (ref 100–199)
CO2 SERPL-SCNC: 24 MMOL/L (ref 20–29)
COLOR UR: YELLOW
CREAT SERPL-MCNC: 0.63 MG/DL (ref 0.57–1)
EGFRCR SERPLBLD CKD-EPI 2021: 92 ML/MIN/1.73
EOSINOPHIL # BLD AUTO: 0.3 X10E3/UL (ref 0–0.4)
EOSINOPHIL NFR BLD AUTO: 3 %
EPI CELLS #/AREA URNS HPF: ABNORMAL /HPF (ref 0–10)
ERYTHROCYTE [DISTWIDTH] IN BLOOD BY AUTOMATED COUNT: 12.2 % (ref 11.7–15.4)
FT4I SERPL CALC-MCNC: 3.7 (ref 1.2–4.9)
GLOBULIN SER CALC-MCNC: 2.2 G/DL (ref 1.5–4.5)
GLUCOSE SERPL-MCNC: 84 MG/DL (ref 70–99)
GLUCOSE UR QL STRIP: NEGATIVE
HBA1C MFR BLD: 5.8 % (ref 4.8–5.6)
HCT VFR BLD AUTO: 43.1 % (ref 34–46.6)
HDLC SERPL-MCNC: 64 MG/DL
HGB BLD-MCNC: 14.3 G/DL (ref 11.1–15.9)
HGB UR QL STRIP: ABNORMAL
IMM GRANULOCYTES # BLD AUTO: 0 X10E3/UL (ref 0–0.1)
IMM GRANULOCYTES NFR BLD AUTO: 0 %
KETONES UR QL STRIP: NEGATIVE
LDLC SERPL CALC-MCNC: 126 MG/DL (ref 0–99)
LEUKOCYTE ESTERASE UR QL STRIP: NEGATIVE
LYMPHOCYTES # BLD AUTO: 2.1 X10E3/UL (ref 0.7–3.1)
LYMPHOCYTES NFR BLD AUTO: 27 %
MCH RBC QN AUTO: 32.2 PG (ref 26.6–33)
MCHC RBC AUTO-ENTMCNC: 33.2 G/DL (ref 31.5–35.7)
MCV RBC AUTO: 97 FL (ref 79–97)
MICRO URNS: ABNORMAL
MONOCYTES # BLD AUTO: 0.6 X10E3/UL (ref 0.1–0.9)
MONOCYTES NFR BLD AUTO: 7 %
NEUTROPHILS # BLD AUTO: 4.8 X10E3/UL (ref 1.4–7)
NEUTROPHILS NFR BLD AUTO: 62 %
NITRITE UR QL STRIP: NEGATIVE
PH UR STRIP: 6 [PH] (ref 5–7.5)
PLATELET # BLD AUTO: 324 X10E3/UL (ref 150–450)
POTASSIUM SERPL-SCNC: 4.5 MMOL/L (ref 3.5–5.2)
PROT SERPL-MCNC: 6.5 G/DL (ref 6–8.5)
PROT UR QL STRIP: NEGATIVE
RBC # BLD AUTO: 4.44 X10E6/UL (ref 3.77–5.28)
RBC #/AREA URNS HPF: ABNORMAL /HPF (ref 0–2)
SODIUM SERPL-SCNC: 140 MMOL/L (ref 134–144)
SP GR UR STRIP: 1.02 (ref 1–1.03)
T3RU NFR SERPL: 35 % (ref 24–39)
T4 SERPL-MCNC: 10.6 UG/DL (ref 4.5–12)
TRIGL SERPL-MCNC: 89 MG/DL (ref 0–149)
TSH SERPL DL<=0.005 MIU/L-ACNC: 0.83 UIU/ML (ref 0.45–4.5)
UROBILINOGEN UR STRIP-MCNC: 0.2 MG/DL (ref 0.2–1)
VLDLC SERPL CALC-MCNC: 16 MG/DL (ref 5–40)
WBC # BLD AUTO: 7.8 X10E3/UL (ref 3.4–10.8)
WBC #/AREA URNS HPF: ABNORMAL /HPF (ref 0–5)

## 2022-11-06 DIAGNOSIS — R31.21 ASYMPTOMATIC MICROSCOPIC HEMATURIA: Primary | ICD-10-CM

## 2022-11-06 PROBLEM — R73.03 PREDIABETES: Status: ACTIVE | Noted: 2022-11-06

## 2022-11-15 ENCOUNTER — TELEPHONE (OUTPATIENT)
Dept: ONCOLOGY | Facility: HOSPITAL | Age: 77
End: 2022-11-15

## 2022-11-15 NOTE — TELEPHONE ENCOUNTER
Spoke with Kathleen at Dr. Mosquera's office. Dr. Mosquera would like labs to be drawn  on day of appointment. Will request lab appointment.

## 2022-11-16 ENCOUNTER — LAB (OUTPATIENT)
Dept: OTHER | Facility: HOSPITAL | Age: 77
End: 2022-11-16

## 2022-11-16 ENCOUNTER — INFUSION (OUTPATIENT)
Dept: ONCOLOGY | Facility: HOSPITAL | Age: 77
End: 2022-11-16

## 2022-11-16 VITALS
OXYGEN SATURATION: 95 % | HEART RATE: 76 BPM | BODY MASS INDEX: 28.4 KG/M2 | RESPIRATION RATE: 18 BRPM | TEMPERATURE: 97.6 F | WEIGHT: 150.4 LBS | HEIGHT: 61 IN

## 2022-11-16 DIAGNOSIS — M81.0 AGE-RELATED OSTEOPOROSIS WITHOUT CURRENT PATHOLOGICAL FRACTURE: Primary | ICD-10-CM

## 2022-11-16 DIAGNOSIS — M81.0 AGE-RELATED OSTEOPOROSIS WITHOUT CURRENT PATHOLOGICAL FRACTURE: ICD-10-CM

## 2022-11-16 LAB
25(OH)D3 SERPL-MCNC: 36.8 NG/ML (ref 30–100)
ALBUMIN SERPL-MCNC: 4.3 G/DL (ref 3.5–5.2)
ALBUMIN/GLOB SERPL: 1.6 G/DL
ALP SERPL-CCNC: 104 U/L (ref 39–117)
ALT SERPL W P-5'-P-CCNC: 21 U/L (ref 1–33)
ANION GAP SERPL CALCULATED.3IONS-SCNC: 8 MMOL/L (ref 5–15)
AST SERPL-CCNC: 20 U/L (ref 1–32)
BILIRUB SERPL-MCNC: 0.3 MG/DL (ref 0–1.2)
BUN SERPL-MCNC: 16 MG/DL (ref 8–23)
BUN/CREAT SERPL: 25 (ref 7–25)
CALCIUM SPEC-SCNC: 9.7 MG/DL (ref 8.6–10.5)
CHLORIDE SERPL-SCNC: 103 MMOL/L (ref 98–107)
CO2 SERPL-SCNC: 29 MMOL/L (ref 22–29)
CREAT SERPL-MCNC: 0.64 MG/DL (ref 0.57–1)
EGFRCR SERPLBLD CKD-EPI 2021: 92.3 ML/MIN/1.73
GLOBULIN UR ELPH-MCNC: 2.7 GM/DL
GLUCOSE SERPL-MCNC: 96 MG/DL (ref 65–99)
MAGNESIUM SERPL-MCNC: 1.9 MG/DL (ref 1.6–2.4)
PHOSPHATE SERPL-MCNC: 3.4 MG/DL (ref 2.5–4.5)
POTASSIUM SERPL-SCNC: 4.2 MMOL/L (ref 3.5–5.2)
PROT SERPL-MCNC: 7 G/DL (ref 6–8.5)
SODIUM SERPL-SCNC: 140 MMOL/L (ref 136–145)

## 2022-11-16 PROCEDURE — 25010000002 DENOSUMAB 60 MG/ML SOLUTION PREFILLED SYRINGE: Performed by: FAMILY MEDICINE

## 2022-11-16 PROCEDURE — 84100 ASSAY OF PHOSPHORUS: CPT | Performed by: FAMILY MEDICINE

## 2022-11-16 PROCEDURE — 80053 COMPREHEN METABOLIC PANEL: CPT | Performed by: FAMILY MEDICINE

## 2022-11-16 PROCEDURE — 96372 THER/PROPH/DIAG INJ SC/IM: CPT

## 2022-11-16 PROCEDURE — 83735 ASSAY OF MAGNESIUM: CPT | Performed by: FAMILY MEDICINE

## 2022-11-16 PROCEDURE — 82306 VITAMIN D 25 HYDROXY: CPT | Performed by: FAMILY MEDICINE

## 2022-11-16 RX ADMIN — DENOSUMAB 60 MG: 60 INJECTION SUBCUTANEOUS at 14:39

## 2022-11-16 NOTE — NURSING NOTE
Arrived ambulatory for prolia injection. Indication and side effects reviewed. Denies recent dental work. Labs and medications verified. Prolia administered in arm without incidence. Instructed to call prescribing MD for any concerns or questions and instructed on how to schedule future appts.  Pt vu and discharged in stable condition.

## 2022-11-30 DIAGNOSIS — L43.9 LICHEN PLANUS: ICD-10-CM

## 2022-11-30 RX ORDER — CLOBETASOL PROPIONATE 0.5 MG/G
CREAM TOPICAL
Qty: 60 G | Refills: 5 | Status: SHIPPED | OUTPATIENT
Start: 2022-11-30

## 2022-11-30 NOTE — TELEPHONE ENCOUNTER
Rx Refill Note  Requested Prescriptions     Pending Prescriptions Disp Refills   • clobetasol (TEMOVATE) 0.05 % cream [Pharmacy Med Name: CLOBETASOL 0.05% CREAM] 60 g 5     Sig: APPLY TO AFFECTED AREA(S) DAILY AS DIRECTED      Last office visit with prescribing clinician: 10/27/2022   Last telemedicine visit with prescribing clinician: Visit date not found   Next office visit with prescribing clinician: Visit date not found                         Would you like a call back once the refill request has been completed: [] Yes [] No    If the office needs to give you a call back, can they leave a voicemail: [] Yes [] No    Ivis Salas MA  11/30/22, 10:08 EST

## 2022-12-20 ENCOUNTER — HOSPITAL ENCOUNTER (OUTPATIENT)
Dept: CT IMAGING | Facility: HOSPITAL | Age: 77
Discharge: HOME | End: 2022-12-20
Admitting: FAMILY MEDICINE

## 2022-12-20 PROCEDURE — 71271 CT THORAX LUNG CANCER SCR C-: CPT

## 2022-12-26 PROBLEM — I25.10 CORONARY ARTERY CALCIFICATION: Status: ACTIVE | Noted: 2022-12-26

## 2022-12-26 PROBLEM — R91.8 PULMONARY NODULES: Status: ACTIVE | Noted: 2022-12-26

## 2022-12-26 PROBLEM — I25.84 CORONARY ARTERY CALCIFICATION: Status: ACTIVE | Noted: 2022-12-26

## 2022-12-29 ENCOUNTER — HOSPITAL ENCOUNTER (OUTPATIENT)
Dept: CT IMAGING | Facility: HOSPITAL | Age: 77
Discharge: HOME | End: 2022-12-29
Admitting: FAMILY MEDICINE
Payer: MEDICARE

## 2022-12-29 DIAGNOSIS — R31.21 ASYMPTOMATIC MICROSCOPIC HEMATURIA: ICD-10-CM

## 2022-12-29 LAB — CREAT BLDA-MCNC: 0.6 MG/DL (ref 0.6–1.3)

## 2022-12-29 PROCEDURE — 25010000002 IOPAMIDOL 61 % SOLUTION: Performed by: FAMILY MEDICINE

## 2022-12-29 PROCEDURE — 74178 CT ABD&PLV WO CNTR FLWD CNTR: CPT

## 2022-12-29 PROCEDURE — 82565 ASSAY OF CREATININE: CPT

## 2022-12-29 RX ADMIN — IOPAMIDOL 85 ML: 612 INJECTION, SOLUTION INTRAVENOUS at 14:50

## 2023-01-02 DIAGNOSIS — R31.21 ASYMPTOMATIC MICROSCOPIC HEMATURIA: ICD-10-CM

## 2023-01-02 DIAGNOSIS — Z87.891 PERSONAL HISTORY OF NICOTINE DEPENDENCE: ICD-10-CM

## 2023-01-02 DIAGNOSIS — N28.1 COMPLEX RENAL CYST: Primary | ICD-10-CM

## 2023-01-03 NOTE — PROGRESS NOTES
The good news is that the CT did not show any causes of blood in your urine, in particular it did not show any kidney cancers.  It did show what looks like a cyst on your kidneys that we can discuss rechecking in 1 year.    Unfortunately, this means that we do not have a cause of the blood in your urine and still need to evaluate the bladder.  Sometimes bladder tumors, which can be common in smokers, can cause this bleeding.  I am going to refer you to a urologist to discuss evaluating this further.  They should call you for an appointment.

## 2023-05-18 ENCOUNTER — INFUSION (OUTPATIENT)
Dept: ONCOLOGY | Facility: HOSPITAL | Age: 78
End: 2023-05-18
Payer: MEDICARE

## 2023-05-18 ENCOUNTER — APPOINTMENT (OUTPATIENT)
Dept: OTHER | Facility: HOSPITAL | Age: 78
End: 2023-05-18
Payer: MEDICARE

## 2023-05-18 ENCOUNTER — LAB (OUTPATIENT)
Dept: OTHER | Facility: HOSPITAL | Age: 78
End: 2023-05-18
Payer: MEDICARE

## 2023-05-18 VITALS — HEIGHT: 61 IN | RESPIRATION RATE: 15 BRPM | TEMPERATURE: 96.9 F | BODY MASS INDEX: 28.42 KG/M2

## 2023-05-18 DIAGNOSIS — E55.9 VITAMIN D DEFICIENCY: Primary | ICD-10-CM

## 2023-05-18 DIAGNOSIS — M81.0 AGE-RELATED OSTEOPOROSIS WITHOUT CURRENT PATHOLOGICAL FRACTURE: ICD-10-CM

## 2023-05-18 DIAGNOSIS — M81.0 AGE-RELATED OSTEOPOROSIS WITHOUT CURRENT PATHOLOGICAL FRACTURE: Primary | ICD-10-CM

## 2023-05-18 LAB
25(OH)D3 SERPL-MCNC: 26.6 NG/ML (ref 30–100)
CALCIUM SPEC-SCNC: 9.3 MG/DL (ref 8.6–10.5)
MAGNESIUM SERPL-MCNC: 2 MG/DL (ref 1.6–2.4)
PHOSPHATE SERPL-MCNC: 3.2 MG/DL (ref 2.5–4.5)

## 2023-05-18 PROCEDURE — 84100 ASSAY OF PHOSPHORUS: CPT | Performed by: FAMILY MEDICINE

## 2023-05-18 PROCEDURE — 82310 ASSAY OF CALCIUM: CPT | Performed by: FAMILY MEDICINE

## 2023-05-18 PROCEDURE — 96372 THER/PROPH/DIAG INJ SC/IM: CPT

## 2023-05-18 PROCEDURE — 25010000002 DENOSUMAB 60 MG/ML SOLUTION PREFILLED SYRINGE: Performed by: FAMILY MEDICINE

## 2023-05-18 PROCEDURE — 82306 VITAMIN D 25 HYDROXY: CPT | Performed by: FAMILY MEDICINE

## 2023-05-18 PROCEDURE — 83735 ASSAY OF MAGNESIUM: CPT | Performed by: FAMILY MEDICINE

## 2023-05-18 RX ORDER — CHOLECALCIFEROL (VITAMIN D3) 50 MCG
TABLET ORAL
Qty: 90 EACH | Refills: 3 | Status: SHIPPED | OUTPATIENT
Start: 2023-05-18

## 2023-05-18 RX ORDER — CHOLECALCIFEROL (VITAMIN D3) 1250 MCG
CAPSULE ORAL
Qty: 8 CAPSULE | Refills: 0 | Status: SHIPPED | OUTPATIENT
Start: 2023-05-18

## 2023-05-18 RX ADMIN — DENOSUMAB 60 MG: 60 INJECTION SUBCUTANEOUS at 13:43

## 2023-05-19 NOTE — PROGRESS NOTES
I am going to give you a short course of a high-dose vitamin D.  Once you have completed it I want you to take vitamin D3 2000 IU once daily instead of 400 IU.  I have sent this into the pharmacy.

## 2023-08-23 DIAGNOSIS — E03.9 ACQUIRED HYPOTHYROIDISM: ICD-10-CM

## 2023-08-23 RX ORDER — LEVOTHYROXINE SODIUM 150 MCG
TABLET ORAL
Qty: 90 TABLET | Refills: 3 | Status: SHIPPED | OUTPATIENT
Start: 2023-08-23

## 2023-08-24 DIAGNOSIS — E03.9 ACQUIRED HYPOTHYROIDISM: ICD-10-CM

## 2023-08-25 RX ORDER — LEVOTHYROXINE SODIUM 150 MCG
TABLET ORAL
Qty: 90 TABLET | Refills: 3 | OUTPATIENT
Start: 2023-08-25

## 2023-12-06 DIAGNOSIS — L43.9 LICHEN PLANUS: ICD-10-CM

## 2023-12-06 RX ORDER — CLOBETASOL PROPIONATE 0.5 MG/G
CREAM TOPICAL
Qty: 60 G | Refills: 3 | Status: SHIPPED | OUTPATIENT
Start: 2023-12-06

## 2023-12-06 NOTE — TELEPHONE ENCOUNTER
Rx Refill Note  Requested Prescriptions     Pending Prescriptions Disp Refills    clobetasol (TEMOVATE) 0.05 % cream [Pharmacy Med Name: CLOBETASOL 0.05% CREAM] 60 g 5     Sig: APPLY TO AFFECTED AREA(S) DAILY AS DIRECTED      Last office visit with prescribing clinician: 10/27/2022   Last telemedicine visit with prescribing clinician: Visit date not found   Next office visit with prescribing clinician: 1/9/2024                         Would you like a call back once the refill request has been completed: [] Yes [] No    If the office needs to give you a call back, can they leave a voicemail: [] Yes [] No    Radha Weems LPN  12/06/23, 12:41 EST

## 2024-01-09 ENCOUNTER — OFFICE VISIT (OUTPATIENT)
Dept: INTERNAL MEDICINE | Facility: CLINIC | Age: 79
End: 2024-01-09
Payer: MEDICARE

## 2024-01-09 VITALS
WEIGHT: 153.8 LBS | OXYGEN SATURATION: 95 % | HEART RATE: 83 BPM | DIASTOLIC BLOOD PRESSURE: 68 MMHG | SYSTOLIC BLOOD PRESSURE: 124 MMHG | BODY MASS INDEX: 29.06 KG/M2

## 2024-01-09 DIAGNOSIS — R31.21 ASYMPTOMATIC MICROSCOPIC HEMATURIA: ICD-10-CM

## 2024-01-09 DIAGNOSIS — R91.8 PULMONARY NODULES: ICD-10-CM

## 2024-01-09 DIAGNOSIS — E03.9 ACQUIRED HYPOTHYROIDISM: ICD-10-CM

## 2024-01-09 DIAGNOSIS — Z00.00 ENCOUNTER FOR SUBSEQUENT ANNUAL WELLNESS VISIT (AWV) IN MEDICARE PATIENT: Primary | ICD-10-CM

## 2024-01-09 DIAGNOSIS — E55.9 VITAMIN D DEFICIENCY: ICD-10-CM

## 2024-01-09 DIAGNOSIS — E78.5 HYPERLIPIDEMIA, UNSPECIFIED HYPERLIPIDEMIA TYPE: ICD-10-CM

## 2024-01-09 DIAGNOSIS — N28.1 COMPLEX RENAL CYST: ICD-10-CM

## 2024-01-09 DIAGNOSIS — R06.2 WHEEZING: ICD-10-CM

## 2024-01-09 DIAGNOSIS — R73.03 PREDIABETES: ICD-10-CM

## 2024-01-09 DIAGNOSIS — Z12.31 ENCOUNTER FOR SCREENING MAMMOGRAM FOR MALIGNANT NEOPLASM OF BREAST: ICD-10-CM

## 2024-01-09 DIAGNOSIS — M17.11 PRIMARY OSTEOARTHRITIS OF RIGHT KNEE: ICD-10-CM

## 2024-01-09 DIAGNOSIS — M81.0 AGE-RELATED OSTEOPOROSIS WITHOUT CURRENT PATHOLOGICAL FRACTURE: ICD-10-CM

## 2024-01-09 DIAGNOSIS — I25.84 CORONARY ARTERY CALCIFICATION: ICD-10-CM

## 2024-01-09 DIAGNOSIS — F17.200 CURRENT EVERY DAY SMOKER: ICD-10-CM

## 2024-01-09 DIAGNOSIS — Z78.0 POSTMENOPAUSAL: ICD-10-CM

## 2024-01-09 DIAGNOSIS — I25.10 CORONARY ARTERY CALCIFICATION: ICD-10-CM

## 2024-01-09 RX ORDER — ALBUTEROL SULFATE 90 UG/1
2 AEROSOL, METERED RESPIRATORY (INHALATION) EVERY 4 HOURS PRN
Qty: 6.7 G | Refills: 3 | Status: SHIPPED | OUTPATIENT
Start: 2024-01-09

## 2024-01-09 NOTE — PROGRESS NOTES
The ABCs of the Annual Wellness Visit  Subsequent Medicare Wellness Visit    Subjective      Jory Crowley is a 78 y.o. female who presents for a Subsequent Medicare Wellness Visit.    Complaint: Subsequent Medicare wellness    No acute concerns.    Hyperlipidemia with history of statin intolerance due to elevated LFTs, tolerating ezetimibe well.  Known coronary artery calcifications.  No chest pain or palpitations.    Hypothyroidism with no current symptoms.    Prediabetes with no recent lifestyle changes.    Osteoarthritis of the right knee with history of corticosteroid injections, does not need one right now.  Symptoms are stable.    Osteoporosis with history of 15 years of alendronate, has had 3 doses of Prolia, needs DEXA updated.  She has tolerated this well without side effects.  She is adherent to her vitamin D supplement.    26-pack-year smoker recently restarted in context of caregiver stress with respect to her 's recent health issues.  Rare wheezing usually related to air quality days but no current problems.  She has not had bronchitis recently.  Following with urology for asymptomatic microscopic hematuria in context of complex renal cyst.  Due for her lung cancer screening with history of pulmonary nodules.    IBS not currently on any medications.       Lives with . Two daughters who live in [place], they are contemplating a move there with 's recent health issues. Prior 26-pack-year smoker quit at age 73, then restarted this year in context of stress about 1 pack every 3 to 5 days.  Rare alcohol use.  Exercises daily by walking her dog about 10,000 steps daily.  Overall healthy diet, tries to minimize red meat.   Breast cancer screening 2022, colon cancer screening 2022 with 3-year interval, DEXA scan 2022, due for lung cancer screening. Retired teacher.  Does not have a living will.  Adverse reaction with high-dose influenza vaccine, receives normal dose influenza vaccine.  Also  discussed shingles vaccine and Td vaccine, not a good time for that right now.     The following portions of the patient's history were reviewed and   updated as appropriate: allergies, current medications, past family history, past medical history, past social history, past surgical history, and problem list.    Compared to one year ago, the patient feels her physical   health is the same.    Compared to one year ago, the patient feels her mental   health is worse.    Recent Hospitalizations:  She was not admitted to the hospital during the last year.       Current Medical Providers:  Patient Care Team:  Gerald Mosquera MD as PCP - General (Family Medicine)  Gerald Mosquera MD as Referring Physician (Family Medicine)    Outpatient Medications Prior to Visit   Medication Sig Dispense Refill    Cholecalciferol (D3) 50 MCG (2000 UT) tablet Take 1 tab by mouth daily for vitamin D deficiency 90 each 3    clobetasol (TEMOVATE) 0.05 % cream APPLY TO AFFECTED AREA(S) DAILY AS DIRECTED 60 g 3    Synthroid 150 MCG tablet TAKE ONE TABLET BY MOUTH DAILY 90 tablet 3    Cholecalciferol (Vitamin D3) 1.25 MG (32322 UT) capsule Take 1 capsule once weekly for 8 weeks, then start taking vitamin D3 2000 IU daily (Patient not taking: Reported on 1/9/2024) 8 capsule 0     No facility-administered medications prior to visit.       No opioid medication identified on active medication list. I have reviewed chart for other potential  high risk medication/s and harmful drug interactions in the elderly.        Aspirin is not on active medication list.   In context of ongoing hematuria evaluation holding aspirin .    Patient Active Problem List   Diagnosis    Atopic rhinitis    Hyperlipidemia    Hypothyroidism    Osteoporosis    Vitamin D deficiency    Irritable bowel syndrome with diarrhea    Hearing loss    Primary osteoarthritis of right knee    Family history of colonic polyps    Prediabetes    Coronary artery calcification    Pulmonary  "nodules    Complex renal cyst    Current every day smoker     Advance Care Planning   Advance Care Planning     Advance Directive is not on file.  ACP discussion was held with the patient during this visit. Patient has an advance directive (not in EMR), copy requested.     Objective    Vitals:    24 0953   BP: 124/68   Pulse: 83   SpO2: 95%   Weight: 69.8 kg (153 lb 12.8 oz)     Estimated body mass index is 29.06 kg/m² as calculated from the following:    Height as of 23: 154.9 cm (61\").    Weight as of this encounter: 69.8 kg (153 lb 12.8 oz).    BMI is >= 25 and <30. (Overweight) The following options were offered after discussion;: exercise counseling/recommendations and nutrition counseling/recommendations      Does the patient have evidence of cognitive impairment?   No            HEALTH RISK ASSESSMENT    Smoking Status:  Social History     Tobacco Use   Smoking Status Some Days    Packs/day: .25    Types: Cigarettes   Smokeless Tobacco Former   Tobacco Comments    Picked up again this summer.     Alcohol Consumption:  Social History     Substance and Sexual Activity   Alcohol Use Yes    Comment: Maybe twice a year one glass of wine     Fall Risk Screen:    STEADI Fall Risk Assessment was completed, and patient is at LOW risk for falls.Assessment completed on:2024    Depression Screenin/9/2024     9:55 AM   PHQ-2/PHQ-9 Depression Screening   Little Interest or Pleasure in Doing Things 0-->not at all   Feeling Down, Depressed or Hopeless 0-->not at all   PHQ-9: Brief Depression Severity Measure Score 0       Health Habits and Functional and Cognitive Screenin/7/2024    12:08 PM   Functional & Cognitive Status   Do you have difficulty preparing food and eating? No   Do you have difficulty bathing yourself, getting dressed or grooming yourself? No   Do you have difficulty using the toilet? No   Do you have difficulty moving around from place to place? No   Do you have trouble with " steps or getting out of a bed or a chair? No   Current Diet Well Balanced Diet   Dental Exam Up to date   Eye Exam Up to date   Exercise (times per week) 7 times per week   Current Exercises Include Gardening;Walking   Do you need help using the phone?  No   Are you deaf or do you have serious difficulty hearing?  No   Do you need help to go to places out of walking distance? No   Do you need help shopping? No   Do you need help preparing meals?  No   Do you need help with housework?  No   Do you need help with laundry? No   Do you need help taking your medications? No   Do you need help managing money? No   Do you ever drive or ride in a car without wearing a seat belt? No   Have you felt unusual stress, anger or loneliness in the last month? No   Who do you live with? Spouse   Have you been bothered in the last four weeks by sexual problems? No   Do you have difficulty concentrating, remembering or making decisions? No       Age-appropriate Screening Schedule:  Refer to the list below for future screening recommendations based on patient's age, sex and/or medical conditions. Orders for these recommended tests are listed in the plan section. The patient has been provided with a written plan.    Health Maintenance   Topic Date Due    ZOSTER VACCINE (1 of 2) Never done    TDAP/TD VACCINES (2 - Td or Tdap) 03/27/2023    ANNUAL WELLNESS VISIT  10/27/2023    LIPID PANEL  10/27/2023    COLORECTAL CANCER SCREENING  02/01/2025    COVID-19 Vaccine  Completed    Pneumococcal Vaccine 65+  Completed    HEPATITIS C SCREENING  Discontinued    DXA SCAN  Discontinued                  CMS Preventative Services Quick Reference  Risk Factors Identified During Encounter:    Immunizations Discussed/Encouraged: Td and Shingrix  Tobacco Use/Dependance Risk: Precontemplative    The above risks/problems have been discussed with the patient.  Pertinent information has been shared with the patient in the After Visit Summary.    Pleasant  78-year-old female smoker with hypothyroidism, hyperlipidemia, coronary calcifications, osteoporosis, osteoarthritis of the right knee, history of hypertension, prediabetes, vitamin D deficiency, IBS, among other problems, who presents with the following:     Diagnoses and all orders for this visit:    1. Encounter for subsequent annual wellness visit (AWV) in Medicare patient (Primary): We discussed preventative care including age and patient-appropriate immunizations and cancer screening. We also discussed the importance of exercise and a healthy diet. This is their annual preventative exam.    2. Hyperlipidemia, unspecified hyperlipidemia type: Tolerating ezetimibe well with history of elevated LFTs with statin. Possible history of pancreatitis with fibrates? May need to consider additional therapies.  -     CBC & Differential  -     Comprehensive Metabolic Panel  -     Lipid Panel    3. Coronary artery calcification: Recommend smoking cessation, holding on aspirin now due to hematuria evaluation    4. Acquired hypothyroidism: Asymptomatic  -     TSH  -     T4, Free  -     T3, Free    5. Prediabetes: No recent lifestyle changes  -     Hemoglobin A1c    6. Primary osteoarthritis of right knee: Not interested in corticosteroid injection today    7. Age-related osteoporosis without current pathological fracture: History of 15 years of alendronate, 3 doses of Prolia, continue Prolia and update DEXA this year  -     Vitamin D,25-Hydroxy  -     Ambulatory Referral to [facility] For Infusion Treatment    8. Vitamin D deficiency: Continue supplementation    9. Current every day smoker: Precontemplative, will update lung cancer screening  10. Pulmonary nodules  -     CT Chest With Contrast; Future    11. Wheezing: No significantly symptomatic chronic obstructive pulmonary disease  -     albuterol sulfate  (90 Base) MCG/ACT inhaler; Inhale 2 puffs Every 4 (Four) Hours As Needed for Wheezing.  Dispense: 6.7 g; Refill:  3    12. Complex renal cyst: Continue to follow with urology  13. Asymptomatic microscopic hematuria  -     Urinalysis With Microscopic - Urine, Clean Catch    14. Encounter for screening mammogram for malignant neoplasm of breast  -     Mammo Screening Digital Tomosynthesis Bilateral With CAD; Future    15. Postmenopausal  -     DEXA Bone Density Axial    Follow Up:   Next Medicare Wellness visit to be scheduled in 1 year.      An After Visit Summary and PPPS were made available to the patient.

## 2024-01-10 LAB
25(OH)D3+25(OH)D2 SERPL-MCNC: 36.4 NG/ML (ref 30–100)
ALBUMIN SERPL-MCNC: 4.3 G/DL (ref 3.8–4.8)
ALBUMIN/GLOB SERPL: 1.8 {RATIO} (ref 1.2–2.2)
ALP SERPL-CCNC: 92 IU/L (ref 44–121)
ALT SERPL-CCNC: 23 IU/L (ref 0–32)
APPEARANCE UR: CLEAR
AST SERPL-CCNC: 18 IU/L (ref 0–40)
BACTERIA #/AREA URNS HPF: NORMAL /[HPF]
BASOPHILS # BLD AUTO: 0 X10E3/UL (ref 0–0.2)
BASOPHILS NFR BLD AUTO: 1 %
BILIRUB SERPL-MCNC: 0.3 MG/DL (ref 0–1.2)
BILIRUB UR QL STRIP: NEGATIVE
BUN SERPL-MCNC: 14 MG/DL (ref 8–27)
BUN/CREAT SERPL: 22 (ref 12–28)
CALCIUM SERPL-MCNC: 9.5 MG/DL (ref 8.7–10.3)
CASTS URNS QL MICRO: NORMAL /LPF
CHLORIDE SERPL-SCNC: 105 MMOL/L (ref 96–106)
CHOLEST SERPL-MCNC: 230 MG/DL (ref 100–199)
CO2 SERPL-SCNC: 24 MMOL/L (ref 20–29)
COLOR UR: YELLOW
CREAT SERPL-MCNC: 0.64 MG/DL (ref 0.57–1)
EGFRCR SERPLBLD CKD-EPI 2021: 92 ML/MIN/1.73
EOSINOPHIL # BLD AUTO: 0.1 X10E3/UL (ref 0–0.4)
EOSINOPHIL NFR BLD AUTO: 2 %
EPI CELLS #/AREA URNS HPF: NORMAL /HPF (ref 0–10)
ERYTHROCYTE [DISTWIDTH] IN BLOOD BY AUTOMATED COUNT: 12.4 % (ref 11.7–15.4)
GLOBULIN SER CALC-MCNC: 2.4 G/DL (ref 1.5–4.5)
GLUCOSE SERPL-MCNC: 100 MG/DL (ref 70–99)
GLUCOSE UR QL STRIP: NEGATIVE
HBA1C MFR BLD: 5.8 % (ref 4.8–5.6)
HCT VFR BLD AUTO: 43 % (ref 34–46.6)
HDLC SERPL-MCNC: 71 MG/DL
HGB BLD-MCNC: 14.5 G/DL (ref 11.1–15.9)
HGB UR QL STRIP: NEGATIVE
IMM GRANULOCYTES # BLD AUTO: 0 X10E3/UL (ref 0–0.1)
IMM GRANULOCYTES NFR BLD AUTO: 0 %
KETONES UR QL STRIP: NEGATIVE
LDLC SERPL CALC-MCNC: 144 MG/DL (ref 0–99)
LEUKOCYTE ESTERASE UR QL STRIP: NEGATIVE
LYMPHOCYTES # BLD AUTO: 1.8 X10E3/UL (ref 0.7–3.1)
LYMPHOCYTES NFR BLD AUTO: 22 %
MCH RBC QN AUTO: 32.7 PG (ref 26.6–33)
MCHC RBC AUTO-ENTMCNC: 33.7 G/DL (ref 31.5–35.7)
MCV RBC AUTO: 97 FL (ref 79–97)
MICRO URNS: NORMAL
MICRO URNS: NORMAL
MONOCYTES # BLD AUTO: 0.5 X10E3/UL (ref 0.1–0.9)
MONOCYTES NFR BLD AUTO: 6 %
NEUTROPHILS # BLD AUTO: 5.6 X10E3/UL (ref 1.4–7)
NEUTROPHILS NFR BLD AUTO: 69 %
NITRITE UR QL STRIP: NEGATIVE
PH UR STRIP: 6 [PH] (ref 5–7.5)
PLATELET # BLD AUTO: 341 X10E3/UL (ref 150–450)
POTASSIUM SERPL-SCNC: 4.7 MMOL/L (ref 3.5–5.2)
PROT SERPL-MCNC: 6.7 G/DL (ref 6–8.5)
PROT UR QL STRIP: NEGATIVE
RBC # BLD AUTO: 4.43 X10E6/UL (ref 3.77–5.28)
RBC #/AREA URNS HPF: NORMAL /HPF (ref 0–2)
SODIUM SERPL-SCNC: 144 MMOL/L (ref 134–144)
SP GR UR STRIP: 1.02 (ref 1–1.03)
T3FREE SERPL-MCNC: 2.5 PG/ML (ref 2–4.4)
T4 FREE SERPL-MCNC: 2.02 NG/DL (ref 0.82–1.77)
TRIGL SERPL-MCNC: 89 MG/DL (ref 0–149)
TSH SERPL DL<=0.005 MIU/L-ACNC: 1.86 UIU/ML (ref 0.45–4.5)
UROBILINOGEN UR STRIP-MCNC: 0.2 MG/DL (ref 0.2–1)
VLDLC SERPL CALC-MCNC: 15 MG/DL (ref 5–40)
WBC # BLD AUTO: 8 X10E3/UL (ref 3.4–10.8)
WBC #/AREA URNS HPF: NORMAL /HPF (ref 0–5)

## 2024-01-11 RX ORDER — EZETIMIBE 10 MG/1
10 TABLET ORAL DAILY
COMMUNITY

## 2024-01-12 NOTE — PROGRESS NOTES
One of your thyroid hormones is mildly elevated but everything else looks normal including TSH, so I do not recommend any dose changes to your thyroid medication at this time.    Prediabetes is stable compared to last year.    Your cholesterol remains elevated.  You mentioned that you were taking Zetia?  I do not see this medication listed anywhere in your chart, and so it is possible that I misheard you.  If you are taking Zetia I would like to know the dose and for how long you have been taking it.  If you have not been taking Zetia, I think it is a good option for your cholesterol since you do not do well with statin medications.  Please get back to me on this topic so I can make appropriate recommendations.    Everything else looks good and I have no concerns.

## 2024-03-19 ENCOUNTER — HOSPITAL ENCOUNTER (OUTPATIENT)
Dept: MAMMOGRAPHY | Facility: HOSPITAL | Age: 79
Discharge: HOME | End: 2024-03-19
Payer: MEDICARE

## 2024-03-19 ENCOUNTER — HOSPITAL ENCOUNTER (OUTPATIENT)
Dept: CT IMAGING | Facility: HOSPITAL | Age: 79
Discharge: HOME | End: 2024-03-19
Payer: MEDICARE

## 2024-03-19 ENCOUNTER — LAB (OUTPATIENT)
Dept: OTHER | Facility: HOSPITAL | Age: 79
End: 2024-03-19
Payer: MEDICARE

## 2024-03-19 ENCOUNTER — INFUSION (OUTPATIENT)
Dept: ONCOLOGY | Facility: HOSPITAL | Age: 79
End: 2024-03-19
Payer: MEDICARE

## 2024-03-19 VITALS — TEMPERATURE: 97.7 F | RESPIRATION RATE: 16 BRPM

## 2024-03-19 DIAGNOSIS — R91.8 PULMONARY NODULES: ICD-10-CM

## 2024-03-19 DIAGNOSIS — M81.0 AGE-RELATED OSTEOPOROSIS WITHOUT CURRENT PATHOLOGICAL FRACTURE: Primary | ICD-10-CM

## 2024-03-19 DIAGNOSIS — Z12.31 ENCOUNTER FOR SCREENING MAMMOGRAM FOR MALIGNANT NEOPLASM OF BREAST: ICD-10-CM

## 2024-03-19 DIAGNOSIS — M81.0 AGE-RELATED OSTEOPOROSIS WITHOUT CURRENT PATHOLOGICAL FRACTURE: ICD-10-CM

## 2024-03-19 LAB
25(OH)D3 SERPL-MCNC: 38 NG/ML (ref 30–100)
ALBUMIN SERPL-MCNC: 4.5 G/DL (ref 3.5–5.2)
ALBUMIN/GLOB SERPL: 1.7 G/DL
ALP SERPL-CCNC: 110 U/L (ref 39–117)
ALT SERPL W P-5'-P-CCNC: 29 U/L (ref 1–33)
ANION GAP SERPL CALCULATED.3IONS-SCNC: 9.2 MMOL/L (ref 5–15)
AST SERPL-CCNC: 19 U/L (ref 1–32)
BILIRUB SERPL-MCNC: 0.3 MG/DL (ref 0–1.2)
BUN SERPL-MCNC: 16 MG/DL (ref 8–23)
BUN/CREAT SERPL: 25.4 (ref 7–25)
CALCIUM SPEC-SCNC: 9.8 MG/DL (ref 8.6–10.5)
CHLORIDE SERPL-SCNC: 103 MMOL/L (ref 98–107)
CO2 SERPL-SCNC: 27.8 MMOL/L (ref 22–29)
CREAT SERPL-MCNC: 0.63 MG/DL (ref 0.57–1)
EGFRCR SERPLBLD CKD-EPI 2021: 92.1 ML/MIN/1.73
GLOBULIN UR ELPH-MCNC: 2.7 GM/DL
GLUCOSE SERPL-MCNC: 95 MG/DL (ref 65–99)
MAGNESIUM SERPL-MCNC: 1.9 MG/DL (ref 1.6–2.4)
PHOSPHATE SERPL-MCNC: 2.8 MG/DL (ref 2.5–4.5)
POTASSIUM SERPL-SCNC: 3.9 MMOL/L (ref 3.5–5.2)
PROT SERPL-MCNC: 7.2 G/DL (ref 6–8.5)
SODIUM SERPL-SCNC: 140 MMOL/L (ref 136–145)

## 2024-03-19 PROCEDURE — 82565 ASSAY OF CREATININE: CPT

## 2024-03-19 PROCEDURE — 77067 SCR MAMMO BI INCL CAD: CPT

## 2024-03-19 PROCEDURE — 84100 ASSAY OF PHOSPHORUS: CPT | Performed by: FAMILY MEDICINE

## 2024-03-19 PROCEDURE — 80053 COMPREHEN METABOLIC PANEL: CPT | Performed by: FAMILY MEDICINE

## 2024-03-19 PROCEDURE — 25010000002 DENOSUMAB 60 MG/ML SOLUTION PREFILLED SYRINGE: Performed by: FAMILY MEDICINE

## 2024-03-19 PROCEDURE — 82306 VITAMIN D 25 HYDROXY: CPT | Performed by: FAMILY MEDICINE

## 2024-03-19 PROCEDURE — 25510000001 IOPAMIDOL 61 % SOLUTION: Performed by: FAMILY MEDICINE

## 2024-03-19 PROCEDURE — 77063 BREAST TOMOSYNTHESIS BI: CPT

## 2024-03-19 PROCEDURE — 71260 CT THORAX DX C+: CPT

## 2024-03-19 PROCEDURE — 96372 THER/PROPH/DIAG INJ SC/IM: CPT

## 2024-03-19 PROCEDURE — 83735 ASSAY OF MAGNESIUM: CPT | Performed by: FAMILY MEDICINE

## 2024-03-19 RX ADMIN — IOPAMIDOL 75 ML: 612 INJECTION, SOLUTION INTRAVENOUS at 13:03

## 2024-03-19 RX ADMIN — DENOSUMAB 60 MG: 60 INJECTION SUBCUTANEOUS at 13:30

## 2024-03-20 DIAGNOSIS — F17.200 CURRENT EVERY DAY SMOKER: ICD-10-CM

## 2024-03-20 DIAGNOSIS — R91.8 PULMONARY NODULES: Primary | ICD-10-CM

## 2024-03-21 LAB — CREAT BLDA-MCNC: 0.5 MG/DL (ref 0.6–1.3)

## 2024-06-24 ENCOUNTER — OFFICE VISIT (OUTPATIENT)
Dept: INTERNAL MEDICINE | Facility: CLINIC | Age: 79
End: 2024-06-24
Payer: MEDICARE

## 2024-06-24 VITALS
TEMPERATURE: 95 F | DIASTOLIC BLOOD PRESSURE: 78 MMHG | OXYGEN SATURATION: 96 % | HEART RATE: 86 BPM | SYSTOLIC BLOOD PRESSURE: 130 MMHG | RESPIRATION RATE: 15 BRPM

## 2024-06-24 DIAGNOSIS — J06.9 UPPER RESPIRATORY TRACT INFECTION, UNSPECIFIED TYPE: Primary | ICD-10-CM

## 2024-06-24 PROCEDURE — 1160F RVW MEDS BY RX/DR IN RCRD: CPT | Performed by: NURSE PRACTITIONER

## 2024-06-24 PROCEDURE — 1126F AMNT PAIN NOTED NONE PRSNT: CPT | Performed by: NURSE PRACTITIONER

## 2024-06-24 PROCEDURE — 99213 OFFICE O/P EST LOW 20 MIN: CPT | Performed by: NURSE PRACTITIONER

## 2024-06-24 PROCEDURE — 1159F MED LIST DOCD IN RCRD: CPT | Performed by: NURSE PRACTITIONER

## 2024-06-24 RX ORDER — METHYLPREDNISOLONE 4 MG/1
TABLET ORAL
Qty: 21 TABLET | Refills: 0 | Status: SHIPPED | OUTPATIENT
Start: 2024-06-24

## 2024-06-24 RX ORDER — DOXYCYCLINE HYCLATE 100 MG/1
100 CAPSULE ORAL 2 TIMES DAILY
Qty: 14 CAPSULE | Refills: 0 | Status: SHIPPED | OUTPATIENT
Start: 2024-06-24 | End: 2024-07-01

## 2024-06-24 NOTE — PROGRESS NOTES
"Chief Complaint  Cough (X 2 weeks. No known exposure, Pts  recently had Sx and was sent home with home health. Pt was not feeling well, and advised HH RN, they took her O2 and it was 92% at that time, last week. States that she did not go to [facility] to be seen, but continued using albuterol. ) and sinus congestion (States that the HH RN recommended Mucinex D, pt did take this and stated that it was helping. But that she felt it too strong. )    Subjective        Jory Crowley presents to [facility]  History of Present Illness  2-week history of cough sinus congestion not improving with over-the-counter medication.    She has been using her albuterol inhaler three times daily for the past week.   She smokes, will have periods of where she quits and then will pick it up again according to history.  Difficult to calculate pack years.  She has not smoke in the past 2-3 months.   CT of the chest with contrast from January 9, 2024 showed stable tiny lung nodules right lower lobe with stable scarring\atelectasis in the lung bases.  There was also stable minimal scarring in the lung apices along with degenerative changes to the thoracic spine.     She is wheezing, cough has been mildly productive after taking OTC decongestant. Her ears a little stopped up, some mild face pain and pressure. She does have seasonal allergies, not taking allergy medication.       Objective   Vital Signs:  /78 (BP Location: Left arm, Patient Position: Sitting, Cuff Size: Adult)   Pulse 86   Temp 95 °F (35 °C) (Infrared)   Resp 15   SpO2 96%   Estimated body mass index is 29.06 kg/m² as calculated from the following:    Height as of 5/18/23: 154.9 cm (61\").    Weight as of 1/9/24: 69.8 kg (153 lb 12.8 oz).               Physical Exam  Vitals reviewed.   Constitutional:       General: She is not in acute distress.     Appearance: Normal appearance. She is not ill-appearing, toxic-appearing or " diaphoretic.   Cardiovascular:      Rate and Rhythm: Normal rate and regular rhythm.      Pulses: Normal pulses.      Heart sounds: Normal heart sounds.   Pulmonary:      Effort: Pulmonary effort is normal.      Breath sounds: Decreased air movement present. Examination of the right-upper field reveals decreased breath sounds. Examination of the left-upper field reveals decreased breath sounds. Examination of the right-middle field reveals decreased breath sounds. Examination of the left-middle field reveals decreased breath sounds. Examination of the right-lower field reveals decreased breath sounds. Examination of the left-lower field reveals decreased breath sounds. Decreased breath sounds and wheezing present. No rhonchi or rales.   Neurological:      Mental Status: She is alert.        Result Review :                   Assessment and Plan   Patient is a very pleasant 78 year-old female with lung nodules, history of smoking, atopic rhinitis, IBS-D, as well as other comorbidities here with cough not improving with OTC medications.           Diagnoses and all orders for this visit:    1. Upper respiratory tract infection, unspecified type (Primary)  -     doxycycline (VIBRAMYCIN) 100 MG capsule; Take 1 capsule by mouth 2 (Two) Times a Day for 7 days.  Dispense: 14 capsule; Refill: 0  -     methylPREDNISolone (MEDROL) 4 MG dose pack; Take as directed on package instructions.  Dispense: 21 tablet; Refill: 0             Follow Up   Return for Follow up with Dr. Mosquera as needed..  Patient was given instructions and counseling regarding her condition or for health maintenance advice. Please see specific information pulled into the AVS if appropriate.

## 2024-08-22 DIAGNOSIS — E03.9 ACQUIRED HYPOTHYROIDISM: ICD-10-CM

## 2024-08-22 RX ORDER — LEVOTHYROXINE SODIUM 150 MCG
150 TABLET ORAL DAILY
Qty: 90 TABLET | Refills: 3 | Status: SHIPPED | OUTPATIENT
Start: 2024-08-22

## 2024-11-16 DIAGNOSIS — L43.9 LICHEN PLANUS: ICD-10-CM

## 2024-11-18 RX ORDER — CLOBETASOL PROPIONATE 0.5 MG/G
CREAM TOPICAL
Qty: 60 G | Refills: 3 | Status: SHIPPED | OUTPATIENT
Start: 2024-11-18

## 2025-01-14 ENCOUNTER — OFFICE VISIT (OUTPATIENT)
Dept: INTERNAL MEDICINE | Facility: CLINIC | Age: 80
End: 2025-01-14
Payer: MEDICARE

## 2025-01-14 VITALS
WEIGHT: 144 LBS | OXYGEN SATURATION: 96 % | HEIGHT: 61 IN | HEART RATE: 100 BPM | TEMPERATURE: 97.6 F | DIASTOLIC BLOOD PRESSURE: 74 MMHG | SYSTOLIC BLOOD PRESSURE: 132 MMHG | BODY MASS INDEX: 27.19 KG/M2

## 2025-01-14 DIAGNOSIS — K58.0 IRRITABLE BOWEL SYNDROME WITH DIARRHEA: ICD-10-CM

## 2025-01-14 DIAGNOSIS — M17.11 PRIMARY OSTEOARTHRITIS OF RIGHT KNEE: ICD-10-CM

## 2025-01-14 DIAGNOSIS — Z78.0 POSTMENOPAUSAL: ICD-10-CM

## 2025-01-14 DIAGNOSIS — R73.03 PREDIABETES: ICD-10-CM

## 2025-01-14 DIAGNOSIS — I25.10 CORONARY ARTERY CALCIFICATION: ICD-10-CM

## 2025-01-14 DIAGNOSIS — E03.9 ACQUIRED HYPOTHYROIDISM: ICD-10-CM

## 2025-01-14 DIAGNOSIS — E55.9 VITAMIN D DEFICIENCY: ICD-10-CM

## 2025-01-14 DIAGNOSIS — F17.200 CURRENT EVERY DAY SMOKER: ICD-10-CM

## 2025-01-14 DIAGNOSIS — Z00.00 ENCOUNTER FOR SUBSEQUENT ANNUAL WELLNESS VISIT (AWV) IN MEDICARE PATIENT: Primary | ICD-10-CM

## 2025-01-14 DIAGNOSIS — R91.8 PULMONARY NODULES: ICD-10-CM

## 2025-01-14 DIAGNOSIS — E78.5 HYPERLIPIDEMIA, UNSPECIFIED HYPERLIPIDEMIA TYPE: ICD-10-CM

## 2025-01-14 DIAGNOSIS — N28.1 COMPLEX RENAL CYST: ICD-10-CM

## 2025-01-14 DIAGNOSIS — Z13.89 SCREENING FOR BLOOD OR PROTEIN IN URINE: ICD-10-CM

## 2025-01-14 DIAGNOSIS — M81.0 AGE-RELATED OSTEOPOROSIS WITHOUT CURRENT PATHOLOGICAL FRACTURE: ICD-10-CM

## 2025-01-14 PROCEDURE — 1170F FXNL STATUS ASSESSED: CPT | Performed by: FAMILY MEDICINE

## 2025-01-14 PROCEDURE — 1126F AMNT PAIN NOTED NONE PRSNT: CPT | Performed by: FAMILY MEDICINE

## 2025-01-14 PROCEDURE — G0439 PPPS, SUBSEQ VISIT: HCPCS | Performed by: FAMILY MEDICINE

## 2025-01-14 RX ORDER — EZETIMIBE 10 MG/1
TABLET ORAL
Qty: 90 TABLET | Refills: 3 | Status: SHIPPED | OUTPATIENT
Start: 2025-01-14

## 2025-01-14 RX ORDER — ASPIRIN 81 MG/1
81 TABLET ORAL DAILY
COMMUNITY

## 2025-01-14 NOTE — ASSESSMENT & PLAN NOTE
Orders:    CBC & Differential    Comprehensive Metabolic Panel    Lipid Panel    TSH    T4, Free    T3, Free

## 2025-01-14 NOTE — ASSESSMENT & PLAN NOTE
Restart Prolia, will rearrange DEXA  Orders:    Vitamin D,25-Hydroxy    PTH, Intact & Calcium    Ambulatory Referral to [facility] For Infusion Treatment

## 2025-01-14 NOTE — PROGRESS NOTES
Subjective   The ABCs of the Annual Wellness Visit  Medicare Wellness Visit  Jory Crowley is a 79 y.o. patient who presents for a Medicare Wellness Visit.    Complaint: Subsequent Medicare wellness     No acute concerns.    Coronary artery calcifications, hyperlipidemia with history of statin intolerance due to elevated LFTs.  She has not been on ezetimibe for some time and is willing to restart this medication.  She is unsure if she ever took it when prescribed previously.    Hypothyroidism with no current symptoms.    Prediabetes with no recent dietary changes.  Her  has been in poor health and so she has not been eating as well.    Osteoarthritis of the right knee, history of benefit from triamcinolone injection, no major symptoms currently.    She has a complex renal cyst currently being followed by urology.     Osteoporosis with history of 15 years of alendronate, to update her DEXA and refresh Prolia orders. She has tolerated this well without side effects.  She is adherent to her vitamin D supplement.     27-pack-year smoker recently restarted in context of caregiver stress with respect to her 's recent health issues.  1 episode of bronchitis this year.  Only had to use her albuterol inhaler 2-3 times.  Precontemplative     IBS not currently on any medications.       Lives with .  Getting harder to take care of the house with her 's declining health.  Two daughters who live in [place]. Prior 26-pack-year smoker quit at age 73, then restarted 2024 in context of stress about 1 pack every 2-3 days.  Rare alcohol use. Not currently exercising.  Use to walk her dog but dog has surgery pending. Breast cancer screening 2024, colon cancer screening 2022 with 3-year interval they have already reached out to her, DEXA scan 2022 overdue, lung cancer screening 2024 already scheduled for follow-up. Retired teacher.  Has a living will not on file.  Adverse reaction with high-dose influenza  vaccine, receives normal dose influenza vaccine.  Also discussed shingles vaccine, PCV 20 and Td vaccine, not a good time for that right now.    The following portions of the patient's history were reviewed and   updated as appropriate: allergies, current medications, past family history, past medical history, past social history, past surgical history, and problem list.    Compared to one year ago, the patient's physical   health is the same.  Compared to one year ago, the patient's mental   health is worse.    Recent Hospitalizations:  She was not admitted to the hospital during the last year.     Current Medical Providers:  Patient Care Team:  Gerald Mosquera MD as PCP - General (Family Medicine)  Gerald Mosquera MD as Referring Physician (Family Medicine)    Outpatient Medications Prior to Visit   Medication Sig Dispense Refill    albuterol sulfate  (90 Base) MCG/ACT inhaler Inhale 2 puffs Every 4 (Four) Hours As Needed for Wheezing. 6.7 g 3    Cholecalciferol (D3) 50 MCG (2000 UT) tablet Take 1 tab by mouth daily for vitamin D deficiency 90 each 3    clobetasol propionate (TEMOVATE) 0.05 % cream APPLY TO AFFECTED AREA(S) DAILY AS DIRECTED 60 g 3    methylPREDNISolone (MEDROL) 4 MG dose pack Take as directed on package instructions. 21 tablet 0    Synthroid 150 MCG tablet TAKE 1 TABLET BY MOUTH DAILY 90 tablet 3    aspirin 81 MG EC tablet Take 1 tablet by mouth Daily.       No facility-administered medications prior to visit.     No opioid medication identified on active medication list. I have reviewed chart for other potential  high risk medication/s and harmful drug interactions in the elderly.      Aspirin is not on active medication list.  Aspirin use is not indicated based on review of current medical condition/s. Risk of harm outweighs potential benefits.  .    Patient Active Problem List   Diagnosis    Atopic rhinitis    Hyperlipidemia    Hypothyroidism    Osteoporosis    Vitamin D deficiency     "Irritable bowel syndrome with diarrhea    Hearing loss    Primary osteoarthritis of right knee    Family history of colonic polyps    Prediabetes    Coronary artery calcification    Pulmonary nodules    Complex renal cyst    Current every day smoker     Advance Care Planning Advance Directive is not on file.  ACP discussion was held with the patient during this visit. Patient has an advance directive (not in EMR), copy requested.            Objective   Vitals:    01/14/25 0955   BP: 132/74   BP Location: Left arm   Patient Position: Sitting   Cuff Size: Adult   Pulse: 100   Temp: 97.6 °F (36.4 °C)   TempSrc: Infrared   SpO2: 96%   Weight: 65.3 kg (144 lb)   Height: 154.9 cm (61\")   PainSc: 0-No pain       Estimated body mass index is 27.21 kg/m² as calculated from the following:    Height as of this encounter: 154.9 cm (61\").    Weight as of this encounter: 65.3 kg (144 lb).    BMI is >= 25 and <30. (Overweight) The following options were offered after discussion;: information on healthy weight added to patient's after visit summary            Does the patient have evidence of cognitive impairment? No                                                                                               Health  Risk Assessment    Smoking Status:  Social History     Tobacco Use   Smoking Status Some Days    Current packs/day: 0.25    Types: Cigarettes   Smokeless Tobacco Former   Tobacco Comments    Picked up again this summer.     Alcohol Consumption:  Social History     Substance and Sexual Activity   Alcohol Use Yes    Comment: Maybe twice a year one glass of wine       Fall Risk Screen  STEADI Fall Risk Assessment was completed, and patient is at LOW risk for falls.Assessment completed on:1/14/2025    Depression Screening   Little interest or pleasure in doing things? Not at all   Feeling down, depressed, or hopeless? Several days   PHQ-2 Total Score 1      Health Habits and Functional and Cognitive Screening:      " 1/14/2025     9:06 AM   Functional & Cognitive Status   Do you have difficulty preparing food and eating? No    Do you have difficulty bathing yourself, getting dressed or grooming yourself? No    Do you have difficulty using the toilet? No    Do you have difficulty moving around from place to place? No    Do you have trouble with steps or getting out of a bed or a chair? No    Current Diet Well Balanced Diet    Dental Exam Not up to date    Eye Exam Up to date    Exercise (times per week) 7 times per week    Current Exercises Include Walking    Do you need help using the phone?  No    Are you deaf or do you have serious difficulty hearing?  Yes    Do you need help to go to places out of walking distance? No    Do you need help shopping? No    Do you need help preparing meals?  No    Do you need help with housework?  No    Do you need help with laundry? No    Do you need help taking your medications? No    Do you need help managing money? No    Do you ever drive or ride in a car without wearing a seat belt? No    Have you felt unusual stress, anger or loneliness in the last month? Yes    Who do you live with? Spouse    If you need help, do you have trouble finding someone available to you? Yes    Have you been bothered in the last four weeks by sexual problems? No    Do you have difficulty concentrating, remembering or making decisions? No        Patient-reported           Age-appropriate Screening Schedule:  Refer to the list below for future screening recommendations based on patient's age, sex and/or medical conditions. Orders for these recommended tests are listed in the plan section. The patient has been provided with a written plan.    Health Maintenance List  Health Maintenance   Topic Date Due    ZOSTER VACCINE (1 of 2) Never done    TDAP/TD VACCINES (2 - Td or Tdap) 03/27/2023    ANNUAL WELLNESS VISIT  01/09/2025    LIPID PANEL  01/09/2025    COLORECTAL CANCER SCREENING  02/01/2025    COVID-19 Vaccine   Completed    RSV Vaccine - Adults  Completed    Pneumococcal Vaccine 65+  Completed    HEPATITIS C SCREENING  Discontinued    MAMMOGRAM  Discontinued    DXA SCAN  Discontinued                                                                                                                                                CMS Preventative Services Quick Reference  Risk Factors Identified During Encounter  Immunizations Discussed/Encouraged: Td, Prevnar 20 (Pneumococcal 20-valent conjugate), and Shingrix  Inactivity/Sedentary: Patient was advised to exercise at least 150 minutes a week per CDC recommendations.    The above risks/problems have been discussed with the patient.  Pertinent information has been shared with the patient in the After Visit Summary.  An After Visit Summary and PPPS were made available to the patient.    Follow Up:   Next Medicare Wellness visit to be scheduled in 1 year.     Pleasant 79-year-old female with coronary artery calcifications, hyperlipidemia, greater than 25-pack-year smoker, hypothyroidism, prediabetes, right knee osteoarthritis, osteoporosis, IBS, lung nodules and a complex renal cyst, among other problems, who presents for the following:  Assessment & Plan  Encounter for subsequent annual wellness visit (AWV) in Medicare patient   We discussed preventative care including age and patient-appropriate immunizations and cancer screening. We also discussed the importance of exercise and a healthy diet. This is their annual preventative exam.     Due to some ongoing living situation concerns and her caregiver stress, will reach out to Rachel Tablefais[facility] and asked them to get in contact  Coronary artery calcification  Willing to start Zetia, history of statin intolerance due to transaminitis     Hyperlipidemia, unspecified hyperlipidemia type   Orders:    CBC & Differential    Comprehensive Metabolic Panel    Lipid Panel    TSH    T4, Free    T3, Free    Acquired hypothyroidism  Controlled      Prediabetes  Continue dietary measures  Orders:    Hemoglobin A1c    Primary osteoarthritis of right knee  Corticosteroid injection when needed     Complex renal cyst  Continue to follow with urology    Irritable bowel syndrome with diarrhea  Stable, will see how Zetia affects this     Age-related osteoporosis without current pathological fracture  Restart Prolia, will rearrange DEXA  Orders:    Vitamin D,25-Hydroxy    PTH, Intact & Calcium    Ambulatory Referral to [facility] For Infusion Treatment    Vitamin D deficiency    Pulmonary nodules  CT pending     Current every day smoker    Postmenopausal  Orders:    DEXA Bone Density Axial    Screening for blood or protein in urine  Orders:    Urinalysis With Microscopic - Urine, Clean Catch         Follow Up:   Return in about 1 year (around 1/14/2026) for Medicare Wellness.

## 2025-01-15 LAB
25(OH)D3+25(OH)D2 SERPL-MCNC: 34.5 NG/ML (ref 30–100)
ALBUMIN SERPL-MCNC: 4.3 G/DL (ref 3.8–4.8)
ALP SERPL-CCNC: 112 IU/L (ref 44–121)
ALT SERPL-CCNC: 17 IU/L (ref 0–32)
APPEARANCE UR: CLEAR
AST SERPL-CCNC: 16 IU/L (ref 0–40)
BACTERIA #/AREA URNS HPF: NORMAL /[HPF]
BASOPHILS # BLD AUTO: 0 X10E3/UL (ref 0–0.2)
BASOPHILS NFR BLD AUTO: 1 %
BILIRUB SERPL-MCNC: 0.3 MG/DL (ref 0–1.2)
BILIRUB UR QL STRIP: NEGATIVE
BUN SERPL-MCNC: 15 MG/DL (ref 8–27)
BUN/CREAT SERPL: 23 (ref 12–28)
CALCIUM SERPL-MCNC: 9.7 MG/DL (ref 8.7–10.3)
CASTS URNS QL MICRO: NORMAL /LPF
CHLORIDE SERPL-SCNC: 105 MMOL/L (ref 96–106)
CHOLEST SERPL-MCNC: 201 MG/DL (ref 100–199)
CO2 SERPL-SCNC: 26 MMOL/L (ref 20–29)
COLOR UR: YELLOW
CREAT SERPL-MCNC: 0.64 MG/DL (ref 0.57–1)
EGFRCR SERPLBLD CKD-EPI 2021: 91 ML/MIN/1.73
EOSINOPHIL # BLD AUTO: 0.1 X10E3/UL (ref 0–0.4)
EOSINOPHIL NFR BLD AUTO: 2 %
EPI CELLS #/AREA URNS HPF: NORMAL /HPF (ref 0–10)
ERYTHROCYTE [DISTWIDTH] IN BLOOD BY AUTOMATED COUNT: 12.1 % (ref 11.7–15.4)
GLOBULIN SER CALC-MCNC: 2.1 G/DL (ref 1.5–4.5)
GLUCOSE SERPL-MCNC: 97 MG/DL (ref 70–99)
GLUCOSE UR QL STRIP: NEGATIVE
HBA1C MFR BLD: 5.9 % (ref 4.8–5.6)
HCT VFR BLD AUTO: 42.5 % (ref 34–46.6)
HDLC SERPL-MCNC: 69 MG/DL
HGB BLD-MCNC: 14.2 G/DL (ref 11.1–15.9)
HGB UR QL STRIP: ABNORMAL
IMM GRANULOCYTES # BLD AUTO: 0 X10E3/UL (ref 0–0.1)
IMM GRANULOCYTES NFR BLD AUTO: 0 %
INTACT PTH: NORMAL
KETONES UR QL STRIP: NEGATIVE
LDLC SERPL CALC-MCNC: 111 MG/DL (ref 0–99)
LEUKOCYTE ESTERASE UR QL STRIP: NEGATIVE
LYMPHOCYTES # BLD AUTO: 1.5 X10E3/UL (ref 0.7–3.1)
LYMPHOCYTES NFR BLD AUTO: 18 %
MCH RBC QN AUTO: 33.5 PG (ref 26.6–33)
MCHC RBC AUTO-ENTMCNC: 33.4 G/DL (ref 31.5–35.7)
MCV RBC AUTO: 100 FL (ref 79–97)
MICRO URNS: ABNORMAL
MONOCYTES # BLD AUTO: 0.5 X10E3/UL (ref 0.1–0.9)
MONOCYTES NFR BLD AUTO: 6 %
MUCOUS THREADS URNS QL MICRO: PRESENT
NEUTROPHILS # BLD AUTO: 5.8 X10E3/UL (ref 1.4–7)
NEUTROPHILS NFR BLD AUTO: 73 %
NITRITE UR QL STRIP: NEGATIVE
PH UR STRIP: 6 [PH] (ref 5–7.5)
PLATELET # BLD AUTO: 320 X10E3/UL (ref 150–450)
POTASSIUM SERPL-SCNC: 4.2 MMOL/L (ref 3.5–5.2)
PROT SERPL-MCNC: 6.4 G/DL (ref 6–8.5)
PROT UR QL STRIP: NEGATIVE
PTH-INTACT SERPL-MCNC: 26 PG/ML (ref 15–65)
RBC # BLD AUTO: 4.24 X10E6/UL (ref 3.77–5.28)
RBC #/AREA URNS HPF: NORMAL /HPF (ref 0–2)
SODIUM SERPL-SCNC: 144 MMOL/L (ref 134–144)
SP GR UR STRIP: 1.02 (ref 1–1.03)
T3FREE SERPL-MCNC: 2.7 PG/ML (ref 2–4.4)
T4 FREE SERPL-MCNC: 1.91 NG/DL (ref 0.82–1.77)
TRIGL SERPL-MCNC: 118 MG/DL (ref 0–149)
TSH SERPL DL<=0.005 MIU/L-ACNC: 1.18 UIU/ML (ref 0.45–4.5)
UROBILINOGEN UR STRIP-MCNC: 0.2 MG/DL (ref 0.2–1)
VLDLC SERPL CALC-MCNC: 21 MG/DL (ref 5–40)
WBC # BLD AUTO: 7.9 X10E3/UL (ref 3.4–10.8)
WBC #/AREA URNS HPF: NORMAL /HPF (ref 0–5)

## 2025-01-24 ENCOUNTER — LAB (OUTPATIENT)
Dept: OTHER | Facility: HOSPITAL | Age: 80
End: 2025-01-24
Payer: MEDICARE

## 2025-01-24 ENCOUNTER — INFUSION (OUTPATIENT)
Dept: ONCOLOGY | Facility: HOSPITAL | Age: 80
End: 2025-01-24
Payer: MEDICARE

## 2025-01-24 DIAGNOSIS — M81.0 AGE-RELATED OSTEOPOROSIS WITHOUT CURRENT PATHOLOGICAL FRACTURE: ICD-10-CM

## 2025-01-24 DIAGNOSIS — Z86.0101 PERSONAL HISTORY OF ADENOMATOUS AND SERRATED COLON POLYPS: Primary | ICD-10-CM

## 2025-01-24 DIAGNOSIS — M81.0 AGE-RELATED OSTEOPOROSIS WITHOUT CURRENT PATHOLOGICAL FRACTURE: Primary | ICD-10-CM

## 2025-01-24 LAB
25(OH)D3 SERPL-MCNC: 38.3 NG/ML (ref 30–100)
CALCIUM SPEC-SCNC: 9.8 MG/DL (ref 8.6–10.5)
MAGNESIUM SERPL-MCNC: 1.9 MG/DL (ref 1.6–2.4)
PHOSPHATE SERPL-MCNC: 3.4 MG/DL (ref 2.5–4.5)

## 2025-01-24 PROCEDURE — 82310 ASSAY OF CALCIUM: CPT | Performed by: FAMILY MEDICINE

## 2025-01-24 PROCEDURE — 82306 VITAMIN D 25 HYDROXY: CPT | Performed by: FAMILY MEDICINE

## 2025-01-24 PROCEDURE — 96372 THER/PROPH/DIAG INJ SC/IM: CPT

## 2025-01-24 PROCEDURE — 84100 ASSAY OF PHOSPHORUS: CPT | Performed by: FAMILY MEDICINE

## 2025-01-24 PROCEDURE — 83735 ASSAY OF MAGNESIUM: CPT | Performed by: FAMILY MEDICINE

## 2025-01-24 PROCEDURE — 25010000002 DENOSUMAB 60 MG/ML SOLUTION PREFILLED SYRINGE: Performed by: FAMILY MEDICINE

## 2025-01-24 RX ADMIN — DENOSUMAB 60 MG: 60 INJECTION SUBCUTANEOUS at 10:59

## 2025-01-29 ENCOUNTER — HOSPITAL ENCOUNTER (OUTPATIENT)
Dept: BONE DENSITY | Facility: HOSPITAL | Age: 80
Discharge: HOME | End: 2025-01-29
Admitting: FAMILY MEDICINE
Payer: MEDICARE

## 2025-01-29 PROCEDURE — 77080 DXA BONE DENSITY AXIAL: CPT

## 2025-03-07 ENCOUNTER — ANESTHESIA EVENT (OUTPATIENT)
Dept: SURGERY | Facility: SURGERY CENTER | Age: 80
End: 2025-03-07
Payer: MEDICARE

## 2025-03-07 ENCOUNTER — HOSPITAL ENCOUNTER (OUTPATIENT)
Facility: SURGERY CENTER | Age: 80
Setting detail: HOSPITAL OUTPATIENT SURGERY
Discharge: HOME | End: 2025-03-07
Attending: INTERNAL MEDICINE | Admitting: INTERNAL MEDICINE
Payer: MEDICARE

## 2025-03-07 ENCOUNTER — ANESTHESIA (OUTPATIENT)
Dept: SURGERY | Facility: SURGERY CENTER | Age: 80
End: 2025-03-07
Payer: MEDICARE

## 2025-03-07 VITALS
DIASTOLIC BLOOD PRESSURE: 76 MMHG | TEMPERATURE: 99.5 F | HEART RATE: 82 BPM | WEIGHT: 135 LBS | HEIGHT: 65 IN | BODY MASS INDEX: 22.49 KG/M2 | SYSTOLIC BLOOD PRESSURE: 140 MMHG | OXYGEN SATURATION: 97 % | RESPIRATION RATE: 18 BRPM

## 2025-03-07 DIAGNOSIS — Z86.0101 PERSONAL HISTORY OF ADENOMATOUS AND SERRATED COLON POLYPS: ICD-10-CM

## 2025-03-07 PROCEDURE — 45380 COLONOSCOPY AND BIOPSY: CPT | Performed by: INTERNAL MEDICINE

## 2025-03-07 PROCEDURE — 25010000002 GLYCOPYRROLATE 1 MG/5ML SOLUTION: Performed by: ANESTHESIOLOGY

## 2025-03-07 PROCEDURE — 45385 COLONOSCOPY W/LESION REMOVAL: CPT | Performed by: INTERNAL MEDICINE

## 2025-03-07 PROCEDURE — 88305 TISSUE EXAM BY PATHOLOGIST: CPT | Performed by: INTERNAL MEDICINE

## 2025-03-07 PROCEDURE — 25010000002 PROPOFOL 10 MG/ML EMULSION: Performed by: ANESTHESIOLOGY

## 2025-03-07 PROCEDURE — 25010000002 LIDOCAINE 1 % SOLUTION: Performed by: INTERNAL MEDICINE

## 2025-03-07 PROCEDURE — 25810000003 LACTATED RINGERS PER 1000 ML: Performed by: INTERNAL MEDICINE

## 2025-03-07 RX ORDER — SODIUM CHLORIDE 0.9 % (FLUSH) 0.9 %
10 SYRINGE (ML) INJECTION AS NEEDED
Status: DISCONTINUED | OUTPATIENT
Start: 2025-03-07 | End: 2025-03-07 | Stop reason: HOSPADM

## 2025-03-07 RX ORDER — LIDOCAINE HYDROCHLORIDE 10 MG/ML
0.5 INJECTION, SOLUTION INFILTRATION; PERINEURAL ONCE AS NEEDED
Status: COMPLETED | OUTPATIENT
Start: 2025-03-07 | End: 2025-03-07

## 2025-03-07 RX ORDER — GLYCOPYRROLATE 0.2 MG/ML
INJECTION INTRAMUSCULAR; INTRAVENOUS AS NEEDED
Status: DISCONTINUED | OUTPATIENT
Start: 2025-03-07 | End: 2025-03-07 | Stop reason: SURG

## 2025-03-07 RX ORDER — SODIUM CHLORIDE, SODIUM LACTATE, POTASSIUM CHLORIDE, CALCIUM CHLORIDE 600; 310; 30; 20 MG/100ML; MG/100ML; MG/100ML; MG/100ML
1000 INJECTION, SOLUTION INTRAVENOUS CONTINUOUS
Status: DISCONTINUED | OUTPATIENT
Start: 2025-03-07 | End: 2025-03-07 | Stop reason: HOSPADM

## 2025-03-07 RX ADMIN — GLYCOPYRROLATE 0.2 MCG: 0.2 INJECTION, SOLUTION INTRAMUSCULAR; INTRAVENOUS at 11:46

## 2025-03-07 RX ADMIN — PROPOFOL 140 MCG/KG/MIN: 10 INJECTION, EMULSION INTRAVENOUS at 11:33

## 2025-03-07 RX ADMIN — LIDOCAINE HYDROCHLORIDE 100 MG: 10 INJECTION, SOLUTION INFILTRATION; PERINEURAL at 11:33

## 2025-03-07 RX ADMIN — SODIUM CHLORIDE, POTASSIUM CHLORIDE, SODIUM LACTATE AND CALCIUM CHLORIDE 1000 ML: 600; 310; 30; 20 INJECTION, SOLUTION INTRAVENOUS at 11:26

## 2025-03-07 NOTE — ANESTHESIA PREPROCEDURE EVALUATION
Anesthesia Evaluation     Patient summary reviewed and Nursing notes reviewed   no history of anesthetic complications:   NPO Solid Status: > 8 hours  NPO Liquid Status: > 2 hours           Airway   Mallampati: II  Dental      Pulmonary    (+) a smoker Current,  Cardiovascular     (+) hypertension, CAD, hyperlipidemia      Neuro/Psych- negative ROS  GI/Hepatic/Renal/Endo    (+) renal disease-, thyroid problem hypothyroidism    ROS Comment: ibs    Musculoskeletal     Abdominal    Substance History - negative use     OB/GYN negative ob/gyn ROS         Other   arthritis,                 Anesthesia Plan    ASA 2     MAC     (    I have reviewed the patient's history with the patient and the chart, including all pertinent laboratory results and imaging. I have explained the risks of anesthesia including but not limited to dental damage, corneal abrasion, nerve injury, MI, stroke, and death.    )  intravenous induction     Anesthetic plan, risks, benefits, and alternatives have been provided, discussed and informed consent has been obtained with: patient.    CODE STATUS:

## 2025-03-07 NOTE — BRIEF OP NOTE
COLONOSCOPY  Progress Note    Jory Crowley  3/7/2025    Pre-op Diagnosis:   Personal history of adenomatous and serrated colon polyps [Z86.0101]       Post-Op Diagnosis Codes:     * Personal history of adenomatous and serrated colon polyps [Z86.0101]     * Diverticulosis [K57.90]     * Colon polyp [K63.5]    Procedure(s):      Procedure(s):  COLONOSCOPY              Surgeon(s):  Justin Hester MD    Anesthesia: Monitored Anesthesia Care    Staff:   Endo Technician: Trista Hammonds RN  Endo Nurse: Evelia Wells RN       Estimated Blood Loss: none    Urine Voided: * No values recorded between 3/7/2025 11:30 AM and 3/7/2025 11:58 AM *    Specimens:                Specimens       ID Source Type Tests Collected By Collected At Frozen?    A Large Intestine, Sigmoid Colon Polyp TISSUE PATHOLOGY EXAM   Justin Hester MD 3/7/25 1141 No    Description: sigmoid colon polyp X 2    Comment: sigmoid colon polyp X 2     This specimen was not marked as sent.    B Large Intestine, Right / Ascending Colon Polyp TISSUE PATHOLOGY EXAM   Justin Hester MD 3/7/25 1147 No    Description: ASCENDING  COLON POLYP X 2    Comment: ASCENDING COLON POLYP X 2    This specimen was not marked as sent.    C Large Intestine, Transverse Colon Polyp TISSUE PATHOLOGY EXAM   Justin Hester MD 3/7/25 1151 No    Description: TRANSVERSE COLON X 2    Comment: TRANSVERSE COLON X 2    This specimen was not marked as sent.              Drains: * No LDAs found *    Findings: Colon to Cecum Good Prep  Sigmoid Diverticulosis  Polyps-6-Cold Snare x 2, Biopsy      Complications: None          Justin Hester MD     Date: 3/7/2025  Time: 11:58 EST

## 2025-03-07 NOTE — H&P
Patient Care Team:  Gerald Mosquera MD as PCP - General (Family Medicine)  Gerald Mosquera MD as Referring Physician (Family Medicine)    CHIEF COMPLAINT: Personal hx colon polyps    HISTORY OF PRESENT ILLNESS:  Last exam was 2022    Past Medical History:   Diagnosis Date    Breast cyst     Colon polyp     Coronary artery calcification 12/26/2022    Hearing loss 09/17/2020    HTN (hypertension) 07/11/2016    Hyperlipidemia     Hypertension     Hypothyroidism     Irritable bowel syndrome with diarrhea 06/22/2018    Osteopenia     Osteoporosis     Primary osteoarthritis of right knee 09/21/2021     Past Surgical History:   Procedure Laterality Date    BREAST CYST EXCISION      BREAST SURGERY      CHOLECYSTECTOMY      COLONOSCOPY N/A 02/01/2022    Procedure: COLONOSCOPY;  Surgeon: Justin Hester MD;  [place] [facility] [place];  Service: Gastroenterology;  Laterality: N/A;  Polyps    COLONOSCOPY  2/1/2022    HYSTERECTOMY      TONSILLECTOMY       Family History   Problem Relation Age of Onset    Heart attack Mother     Heart disease Mother     Thyroid disease Mother     Heart disease Father     Breast cancer Maternal Grandmother     Leukemia Brother     Thyroid disease Brother     Hearing loss Daughter     Malig Hyperthermia Neg Hx      Social History     Tobacco Use    Smoking status: Some Days     Current packs/day: 0.25     Types: Cigarettes    Smokeless tobacco: Former    Tobacco comments:     Picked up again this summer.   Vaping Use    Vaping status: Never Used   Substance Use Topics    Alcohol use: Yes     Comment: Maybe twice a year one glass of wine    Drug use: Never     Medications Prior to Admission   Medication Sig Dispense Refill Last Dose/Taking    albuterol sulfate  (90 Base) MCG/ACT inhaler Inhale 2 puffs Every 4 (Four) Hours As Needed for Wheezing. 6.7 g 3     aspirin 81 MG EC tablet Take 1 tablet by mouth Daily.       Cholecalciferol (D3) 50 MCG (2000 UT) tablet Take 1 tab by mouth daily  for vitamin D deficiency 90 each 3     clobetasol propionate (TEMOVATE) 0.05 % cream APPLY TO AFFECTED AREA(S) DAILY AS DIRECTED 60 g 3     ezetimibe (ZETIA) 10 MG tablet Take 1 tab PO QD for cholesterol and heart health 90 tablet 3     methylPREDNISolone (MEDROL) 4 MG dose pack Take as directed on package instructions. 21 tablet 0     Synthroid 150 MCG tablet TAKE 1 TABLET BY MOUTH DAILY 90 tablet 3      Allergies:  Codeine, Influenza vaccines, Nabumetone, and Propoxyphene    REVIEW OF SYSTEMS:  Please see the above history of present illness for pertinent positives and negatives.  The remainder of the patient's systems have been reviewed and are negative.     Vital Signs            Physical Exam:  Physical Exam   Constitutional: Patient appears well-developed and well-nourished and in no acute distress   HEENT:   Head: Normocephalic and atraumatic.   Eyes:  Pupils are equal, round, and reactive to light. EOM are intact. Sclerae are anicteric and non-injected.  Mouth and Throat: Patient has moist mucous membranes. Oropharynx is clear of any erythema or exudate.     Neck: Neck supple. No JVD present. No thyromegaly present. No lymphadenopathy present.  Cardiovascular: Regular rate, regular rhythm, S1 normal and S2 normal.  Exam reveals no gallop and no friction rub.  No murmur heard.  Pulmonary/Chest: Lungs are clear to auscultation bilaterally. No respiratory distress. No wheezes. No rhonchi. No rales.   Abdominal: Soft. Bowel sounds are normal. No distension and no mass. There is no hepatosplenomegaly. There is no tenderness.   Musculoskeletal: Normal Muscle tone  Extremities: No edema. Pulses are palpable in all 4 extremities.  Neurological: Patient is alert and oriented to person, place, and time. Cranial nerves II-XII are grossly intact with no focal deficits.  Skin: Skin is warm. No rash noted. Nails show no clubbing.  No cyanosis or erythema.    Debilities/Disabilities Identified: None  Emotional Behavior:  Appropriate     Results Review:   I reviewed the patient's new clinical results.    Lab Results (most recent)       None            Imaging Results (Most Recent)       None          reviewed    ECG/EMG Results (most recent)       None          reviewed    Assessment & Plan   Personal hx colon polyps/  colonoscopy      I discussed the patient's findings and my recommendations with patient.     Justin Hester MD  03/07/25  11:02 EST    Time: 10 min prior to procedure.

## 2025-03-07 NOTE — ANESTHESIA POSTPROCEDURE EVALUATION
"Patient: Jory Crowley    Procedure Summary       Date: 03/07/25 Room / Location: [facility]    Anesthesia Start: 1132 Anesthesia Stop: 1158    Procedure: COLONOSCOPY Diagnosis:       Personal history of adenomatous and serrated colon polyps      Diverticulosis      Colon polyp      (Personal history of adenomatous and serrated colon polyps [Z86.0101])    Surgeons: Justin Hester MD Provider: Valorie Huynh MD    Anesthesia Type: MAC ASA Status: 2            Anesthesia Type: MAC    Vitals  Vitals Value Taken Time   BP 87/51 03/07/25 1206   Temp 37.5 °C (99.5 °F) 03/07/25 1200   Pulse 80 03/07/25 1206   Resp 16 03/07/25 1205   SpO2 95 % 03/07/25 1206   Vitals shown include unfiled device data.        Post Anesthesia Care and Evaluation    Patient location during evaluation: bedside  Patient participation: complete - patient participated  Level of consciousness: awake  Pain management: adequate    Airway patency: patent  Anesthetic complications: No anesthetic complications  PONV Status: controlled  Cardiovascular status: acceptable  Respiratory status: acceptable  Hydration status: acceptable    Comments: BP (!) 87/51 (BP Location: Left arm, Patient Position: Lying)   Pulse 81   Temp 37.5 °C (99.5 °F) (Temporal)   Resp 16   Ht 163.8 cm (64.5\")   Wt 61.2 kg (135 lb)   SpO2 95%   BMI 22.81 kg/m²       "

## 2025-03-10 LAB
CYTO UR: NORMAL
LAB AP CASE REPORT: NORMAL
LAB AP CLINICAL INFORMATION: NORMAL
PATH REPORT.FINAL DX SPEC: NORMAL
PATH REPORT.GROSS SPEC: NORMAL

## 2025-07-29 ENCOUNTER — OFFICE VISIT (OUTPATIENT)
Dept: INTERNAL MEDICINE | Facility: CLINIC | Age: 80
End: 2025-07-29
Payer: MEDICARE

## 2025-07-29 VITALS
DIASTOLIC BLOOD PRESSURE: 84 MMHG | HEIGHT: 65 IN | HEART RATE: 87 BPM | WEIGHT: 134.2 LBS | SYSTOLIC BLOOD PRESSURE: 130 MMHG | TEMPERATURE: 97.8 F | BODY MASS INDEX: 22.36 KG/M2 | OXYGEN SATURATION: 95 %

## 2025-07-29 DIAGNOSIS — E78.5 HYPERLIPIDEMIA, UNSPECIFIED HYPERLIPIDEMIA TYPE: ICD-10-CM

## 2025-07-29 DIAGNOSIS — R73.03 PREDIABETES: ICD-10-CM

## 2025-07-29 DIAGNOSIS — I25.10 CORONARY ARTERY CALCIFICATION: ICD-10-CM

## 2025-07-29 DIAGNOSIS — M81.0 AGE-RELATED OSTEOPOROSIS WITHOUT CURRENT PATHOLOGICAL FRACTURE: Primary | ICD-10-CM

## 2025-07-29 DIAGNOSIS — E55.9 VITAMIN D DEFICIENCY: ICD-10-CM

## 2025-07-29 DIAGNOSIS — E03.9 ACQUIRED HYPOTHYROIDISM: ICD-10-CM

## 2025-07-29 PROCEDURE — 1126F AMNT PAIN NOTED NONE PRSNT: CPT | Performed by: FAMILY MEDICINE

## 2025-07-29 PROCEDURE — 99214 OFFICE O/P EST MOD 30 MIN: CPT | Performed by: FAMILY MEDICINE

## 2025-07-29 PROCEDURE — G2211 COMPLEX E/M VISIT ADD ON: HCPCS | Performed by: FAMILY MEDICINE

## 2025-07-29 NOTE — PROGRESS NOTES
Chief Complaint  Establish Care    Subjective         Jory Crowley presents to [facility]  History of Present Illness    79-year-old female establishing care today.  Patient has hyperlipidemia, coronary artery calcification, osteoporosis, vitamin D deficiency, prediabetes hypothyroidism, right knee osteoarthritis, lung nodule and tobacco use disorder.    Hyperlipidemia/coronary artery calcification: Currently on Zetia 10 mg, she was also on baby aspirin, patient states she has not been taking due to stomach upset when she is taking it at nighttime.  Patient denies history of peptic ulcer disease.  Patient will resume medication, she will take during the day after meals.  Patient denies chest pain, shortness of breath, lower extremity swelling.  She does report occasional palpitations, started this month after her  started, mostly when she received paperwork to fill, patient feels this is an anxiety associated.    Prediabetes: Managed with diet.  Patient has been trying to eat healthy.  She has lost weight, mostly because, mostly because she did not have time to eat while taking care of her  who just passed away last month.    Osteoporosis: Currently on vitamin D supplement and Prolia infusion.  Patient states she was unable to get her Prolia infusion because she needed new doctor orders.    Hypothyroidism: Currently on Synthroid 150 cardiograms    Patient stated she started smoking again while was taking care of her , patient stated she wanted to be fully focused and not on an anxiety medication during that time.  Patient is considering weaning off and stopping smoking on her birthday [birth year].  Patient will buy nicotine patches over-the-counter.    Objective     Review of Systems   Constitutional:  Negative for fatigue and fever.   Respiratory:  Negative for chest tightness and shortness of breath.    Cardiovascular:  Positive for palpitations. Negative for  "chest pain and leg swelling.        Past Medical History:   Diagnosis Date    Breast cyst     Colon polyp     Coronary artery calcification 12/26/2022    Hearing loss 09/17/2020    HTN (hypertension) 07/11/2016    Hyperlipidemia     Hypertension     Hypothyroidism     Irritable bowel syndrome with diarrhea 06/22/2018    Osteopenia     Osteoporosis     Primary osteoarthritis of right knee 09/21/2021        Current Outpatient Medications:     albuterol sulfate  (90 Base) MCG/ACT inhaler, Inhale 2 puffs Every 4 (Four) Hours As Needed for Wheezing., Disp: 6.7 g, Rfl: 3    aspirin 81 MG EC tablet, Take 1 tablet by mouth Daily., Disp: , Rfl:     Cholecalciferol (D3) 50 MCG (2000 UT) tablet, Take 1 tab by mouth daily for vitamin D deficiency, Disp: 90 each, Rfl: 3    clobetasol propionate (TEMOVATE) 0.05 % cream, APPLY TO AFFECTED AREA(S) DAILY AS DIRECTED, Disp: 60 g, Rfl: 3    ezetimibe (ZETIA) 10 MG tablet, Take 1 tab PO QD for cholesterol and heart health, Disp: 90 tablet, Rfl: 3    Synthroid 150 MCG tablet, TAKE 1 TABLET BY MOUTH DAILY, Disp: 90 tablet, Rfl: 3   Social History     Socioeconomic History    Marital status:    Tobacco Use    Smoking status: Some Days     Current packs/day: 0.25     Types: Cigarettes    Smokeless tobacco: Former    Tobacco comments:     Picked up again this summer.   Vaping Use    Vaping status: Never Used   Substance and Sexual Activity    Alcohol use: Yes     Comment: Maybe twice a year one glass of wine    Drug use: Never    Sexual activity: Not Currently     Partners: Male     Birth control/protection: Post-menopausal     Comment: N/A      Vital Signs:   /84 (BP Location: Left arm, Patient Position: Sitting, Cuff Size: Adult)   Pulse 87   Temp 97.8 °F (36.6 °C) (Infrared)   Ht 163.8 cm (64.5\")   Wt 60.9 kg (134 lb 3.2 oz)   SpO2 95%   BMI 22.68 kg/m²       Physical Exam  Constitutional:       Appearance: Normal appearance. She is not ill-appearing.   HENT:    "   Head: Normocephalic and atraumatic.      Mouth/Throat:      Mouth: Mucous membranes are moist.      Pharynx: No posterior oropharyngeal erythema.   Cardiovascular:      Rate and Rhythm: Normal rate and regular rhythm.      Heart sounds: No murmur heard.  Pulmonary:      Effort: Pulmonary effort is normal. No respiratory distress.      Breath sounds: Normal breath sounds. No wheezing.   Musculoskeletal:      Right lower leg: No edema.      Left lower leg: No edema.   Neurological:      Mental Status: She is alert.          Result Review :                  Latest Reference Range & Units 01/14/25 10:33 01/24/25 10:19   Sodium 134 - 144 mmol/L 144    Potassium 3.5 - 5.2 mmol/L 4.2    Chloride 96 - 106 mmol/L 105    CO2 20 - 29 mmol/L 26    BUN 8 - 27 mg/dL 15    Creatinine 0.57 - 1.00 mg/dL 0.64    BUN/Creatinine Ratio 12 - 28  23    EGFR Result >59 mL/min/1.73 91    Glucose 70 - 99 mg/dL 97    Calcium 8.6 - 10.5 mg/dL 9.7 9.8   Magnesium 1.6 - 2.4 mg/dL  1.9   Phosphorus 2.5 - 4.5 mg/dL  3.4   Alkaline Phosphatase 44 - 121 IU/L 112    Total Protein 6.0 - 8.5 g/dL 6.4    Albumin 3.8 - 4.8 g/dL 4.3    AST (SGOT) 0 - 40 IU/L 16    ALT (SGPT) 0 - 32 IU/L 17    Total Bilirubin 0.0 - 1.2 mg/dL 0.3    Hemoglobin A1C 4.8 - 5.6 % 5.9 (H)    PTH Intact (Serial Monitor) 15 - 65 pg/mL  -  26  Comment    TSH Baseline 0.450 - 4.500 uIU/mL 1.180    Free T4 0.82 - 1.77 ng/dL 1.91 (H)    T3, Free 2.0 - 4.4 pg/mL 2.7    Total Cholesterol 100 - 199 mg/dL 201 (H)    HDL Cholesterol >39 mg/dL 69    LDL Cholesterol  0 - 99 mg/dL 111 (H)    Triglycerides 0 - 149 mg/dL 118    VLDL Cholesterol Jose 5 - 40 mg/dL 21    25 Hydroxy, Vitamin D 30.0 - 100.0 ng/ml 34.5 38.3   Globulin 1.5 - 4.5 g/dL 2.1    WBC 3.4 - 10.8 x10E3/uL 7.9    RBC 3.77 - 5.28 x10E6/uL 4.24    Hemoglobin 11.1 - 15.9 g/dL 14.2    Hematocrit 34.0 - 46.6 % 42.5    Platelets 150 - 450 x10E3/uL 320    RDW 11.7 - 15.4 % 12.1    MCV 79 - 97 fL 100 (H)    MCH 26.6 - 33.0 pg 33.5  (H)    MCHC 31.5 - 35.7 g/dL 33.4    Neutrophil Rel % Not Estab. % 73    Lymphocyte Rel % Not Estab. % 18    Monocyte Rel % Not Estab. % 6    Eosinophil Rel % Not Estab. % 2    Basophil Rel % Not Estab. % 1    Immature Granulocyte Rel % Not Estab. % 0    Neutrophils Absolute 1.4 - 7.0 x10E3/uL 5.8    Lymphocytes Absolute 0.7 - 3.1 x10E3/uL 1.5    Monocytes Absolute 0.1 - 0.9 x10E3/uL 0.5    Eosinophils Absolute 0.0 - 0.4 x10E3/uL 0.1    Basophils Absolute 0.0 - 0.2 x10E3/uL 0.0    Immature Grans, Absolute 0.0 - 0.1 x10E3/uL 0.0    Color, UA Yellow  Yellow    Appearance, UA Clear  Clear    Specific Gravity, UA 1.005 - 1.030  1.020    pH, UA 5.0 - 7.5  6.0    Glucose Negative  Negative    Ketones, UA Negative  Negative    Blood, UA Negative  Trace !    Nitrite, UA Negative  Negative    Leukocytes, UA Negative  Negative    Protein, UA Negative/Trace  Negative    Bilirubin, UA Negative  Negative    RBC, UA 0 - 2 /hpf None seen    WBC, UA 0 - 5 /hpf None seen    Bacteria, UA None seen/Few  Few    Mucus, UA Not Estab.  Present    Epithelial Cells (non renal) 0 - 10 /hpf 0-10    Casts None seen /lpf None seen    Microscopic Examination  See below:    Urobilinogen, UA 0.2 - 1.0 mg/dL 0.2    (H): Data is abnormally high  !: Data is abnormal  Assessment and Plan    Diagnoses and all orders for this visit:    1. Hyperlipidemia, unspecified hyperlipidemia type (Primary)    2. Coronary artery calcification    3. Acquired hypothyroidism    4. Vitamin D deficiency    5. Age-related osteoporosis without current pathological fracture    6. Prediabetes        79-year-old female has hyperlipidemia, hypothyroidism, prediabetes, vitamin D deficiency and osteoporosis, lab work 1/14/2025 reviewed, CMP normal, A1c 5.9, normal PTH, normal TSH, slightly elevated FT4, , normal vitamin D, normal hemoglobin, mildly elevated MCV.  Patient declined repeat lab work today, she wants repeat labs during her annual physical.    Coronary artery  calcification/hyperlipidemia  Continue Zetia  Continue baby aspirin, patient will switch to morning after breakfast    Hypothyroidism  Continue Synthroid 125 mcg    Osteoporosis/vitamin D deficiency  Continue vitamin D  Continue Prolia, therapy plan ordered.  Labs before infusion.    Prediabetes  Currently managed with diet  Patient deferred repeat labs        Follow Up   Return in about 6 months (around 1/29/2026).  Patient was given instructions and counseling regarding her condition or for health maintenance advice. Please see specific information pulled into the AVS if appropriate.       EMR Dragon/Transcription disclaimer:   Much of this encounter note is an electronic transcription and translation of spoken language to printed text. The electronic translation of spoken language may permit erroneous, or at times, nonsensical words or phrases to be inadvertently transcribed; Although I have reviewed the note for such errors, some may still exist.

## 2025-08-19 DIAGNOSIS — E03.9 ACQUIRED HYPOTHYROIDISM: ICD-10-CM

## 2025-08-19 RX ORDER — LEVOTHYROXINE SODIUM 150 MCG
150 TABLET ORAL DAILY
Qty: 90 TABLET | Refills: 2 | Status: SHIPPED | OUTPATIENT
Start: 2025-08-19

## 2025-08-29 ENCOUNTER — LAB (OUTPATIENT)
Dept: OTHER | Facility: HOSPITAL | Age: 80
End: 2025-08-29
Payer: MEDICARE

## 2025-08-29 ENCOUNTER — INFUSION (OUTPATIENT)
Dept: ONCOLOGY | Facility: HOSPITAL | Age: 80
End: 2025-08-29
Payer: MEDICARE

## 2025-08-29 DIAGNOSIS — M81.0 AGE-RELATED OSTEOPOROSIS WITHOUT CURRENT PATHOLOGICAL FRACTURE: Primary | ICD-10-CM

## 2025-08-29 DIAGNOSIS — E55.9 VITAMIN D DEFICIENCY: ICD-10-CM

## 2025-08-29 DIAGNOSIS — M81.0 AGE-RELATED OSTEOPOROSIS WITHOUT CURRENT PATHOLOGICAL FRACTURE: ICD-10-CM

## 2025-08-29 LAB
25(OH)D3 SERPL-MCNC: 60 NG/ML (ref 30–100)
ALBUMIN SERPL-MCNC: 4.1 G/DL (ref 3.5–5.2)
ALBUMIN/GLOB SERPL: 1.6 G/DL
ALP SERPL-CCNC: 94 U/L (ref 39–117)
ALT SERPL W P-5'-P-CCNC: 21 U/L (ref 1–33)
ANION GAP SERPL CALCULATED.3IONS-SCNC: 6.2 MMOL/L (ref 5–15)
AST SERPL-CCNC: 16 U/L (ref 1–32)
BILIRUB SERPL-MCNC: 0.3 MG/DL (ref 0–1.2)
BUN SERPL-MCNC: 18.5 MG/DL (ref 8–23)
BUN/CREAT SERPL: 34.9 (ref 7–25)
CA-I SERPL ISE-MCNC: 1.25 MMOL/L (ref 1.15–1.35)
CALCIUM SPEC-SCNC: 9.4 MG/DL (ref 8.6–10.5)
CHLORIDE SERPL-SCNC: 106 MMOL/L (ref 98–107)
CO2 SERPL-SCNC: 27.8 MMOL/L (ref 22–29)
CREAT SERPL-MCNC: 0.53 MG/DL (ref 0.57–1)
EGFRCR SERPLBLD CKD-EPI 2021: 95.4 ML/MIN/1.73
GLOBULIN UR ELPH-MCNC: 2.6 GM/DL
GLUCOSE SERPL-MCNC: 97 MG/DL (ref 65–99)
MAGNESIUM SERPL-MCNC: 1.9 MG/DL (ref 1.6–2.4)
PHOSPHATE SERPL-MCNC: 2.9 MG/DL (ref 2.5–4.5)
POTASSIUM SERPL-SCNC: 4 MMOL/L (ref 3.5–5.2)
PROT SERPL-MCNC: 6.7 G/DL (ref 6–8.5)
SODIUM SERPL-SCNC: 140 MMOL/L (ref 136–145)

## 2025-08-29 PROCEDURE — 82330 ASSAY OF CALCIUM: CPT | Performed by: FAMILY MEDICINE

## 2025-08-29 PROCEDURE — 83735 ASSAY OF MAGNESIUM: CPT | Performed by: FAMILY MEDICINE

## 2025-08-29 PROCEDURE — 96372 THER/PROPH/DIAG INJ SC/IM: CPT

## 2025-08-29 PROCEDURE — 82306 VITAMIN D 25 HYDROXY: CPT | Performed by: FAMILY MEDICINE

## 2025-08-29 PROCEDURE — 25010000002 DENOSUMAB 60 MG/ML SOLUTION PREFILLED SYRINGE: Performed by: FAMILY MEDICINE

## 2025-08-29 PROCEDURE — 80053 COMPREHEN METABOLIC PANEL: CPT | Performed by: FAMILY MEDICINE

## 2025-08-29 PROCEDURE — 84100 ASSAY OF PHOSPHORUS: CPT | Performed by: FAMILY MEDICINE

## 2025-08-29 PROCEDURE — 36415 COLL VENOUS BLD VENIPUNCTURE: CPT

## 2025-08-29 RX ADMIN — DENOSUMAB 60 MG: 60 INJECTION SUBCUTANEOUS at 09:11

## (undated) DEVICE — VIAL FORMLN CAP 10PCT 20ML

## (undated) DEVICE — THE SINGLE USE ETRAP – POLYP TRAP IS USED FOR SUCTION RETRIEVAL OF ENDOSCOPICALLY REMOVED POLYPS.: Brand: ETRAP

## (undated) DEVICE — KT ORCA ORCAPOD DISP STRL

## (undated) DEVICE — CANN NASL CO2 TRULINK W/O2 A/

## (undated) DEVICE — JACKT LAB F/R KNIT CUFF/COLR XLG BLU

## (undated) DEVICE — SINGLE-USE BIOPSY FORCEPS: Brand: RADIAL JAW 4

## (undated) DEVICE — CANN O2 ETCO2 FITS ALL CONN CO2 SMPL A/ 7IN DISP LF

## (undated) DEVICE — FLEX ADVANTAGE 1500CC: Brand: FLEX ADVANTAGE

## (undated) DEVICE — TRAP POLYP 4CHAMBER

## (undated) DEVICE — Device

## (undated) DEVICE — SNAR POLYP CAPTIVATOR RND STFF 2.4 240CM 10MM 1P/U

## (undated) DEVICE — ADAPT CLN SCPE ENDO PORPOISE BX/50 DISP

## (undated) DEVICE — SNAR POLYP SENSATION MICRO OVL 13 240X40